# Patient Record
Sex: FEMALE | Race: BLACK OR AFRICAN AMERICAN | NOT HISPANIC OR LATINO | Employment: FULL TIME | URBAN - METROPOLITAN AREA
[De-identification: names, ages, dates, MRNs, and addresses within clinical notes are randomized per-mention and may not be internally consistent; named-entity substitution may affect disease eponyms.]

---

## 2017-05-22 ENCOUNTER — HOSPITAL ENCOUNTER (EMERGENCY)
Facility: HOSPITAL | Age: 62
Discharge: HOME | End: 2017-05-22
Attending: EMERGENCY MEDICINE | Admitting: INTERNAL MEDICINE
Payer: COMMERCIAL

## 2017-05-22 VITALS
WEIGHT: 205 LBS | TEMPERATURE: 96.8 F | HEART RATE: 61 BPM | DIASTOLIC BLOOD PRESSURE: 64 MMHG | RESPIRATION RATE: 18 BRPM | SYSTOLIC BLOOD PRESSURE: 123 MMHG | OXYGEN SATURATION: 100 % | HEIGHT: 69 IN | BODY MASS INDEX: 30.36 KG/M2

## 2017-05-22 DIAGNOSIS — T18.128A FOOD IMPACTION OF ESOPHAGUS, INITIAL ENCOUNTER: ICD-10-CM

## 2017-05-22 DIAGNOSIS — T18.128A FOOD IMPACTION OF ESOPHAGUS: Primary | ICD-10-CM

## 2017-05-22 PROCEDURE — 99284 EMERGENCY DEPT VISIT MOD MDM: CPT

## 2017-05-22 PROCEDURE — 88305 TISSUE EXAM BY PATHOLOGIST: CPT | Performed by: INTERNAL MEDICINE

## 2017-05-22 PROCEDURE — 88312 SPECIAL STAINS GROUP 1: CPT | Performed by: INTERNAL MEDICINE

## 2017-05-22 PROCEDURE — 96374 THER/PROPH/DIAG INJ IV PUSH: CPT

## 2017-05-22 PROCEDURE — 96375 TX/PRO/DX INJ NEW DRUG ADDON: CPT

## 2017-05-22 RX ORDER — MIDAZOLAM HYDROCHLORIDE 1 MG/ML
INJECTION INTRAMUSCULAR; INTRAVENOUS
Status: DISCONTINUED
Start: 2017-05-22 | End: 2017-05-22 | Stop reason: HOSPADM

## 2017-05-22 RX ORDER — FENTANYL CITRATE 50 UG/ML
50 INJECTION, SOLUTION INTRAMUSCULAR; INTRAVENOUS ONCE
Status: COMPLETED | OUTPATIENT
Start: 2017-05-22 | End: 2017-05-22

## 2017-05-22 RX ORDER — FENTANYL CITRATE 50 UG/ML
INJECTION, SOLUTION INTRAMUSCULAR; INTRAVENOUS
Status: DISCONTINUED
Start: 2017-05-22 | End: 2017-05-22 | Stop reason: HOSPADM

## 2017-05-22 RX ORDER — MIDAZOLAM HYDROCHLORIDE 1 MG/ML
2 INJECTION INTRAMUSCULAR; INTRAVENOUS ONCE
Status: COMPLETED | OUTPATIENT
Start: 2017-05-22 | End: 2017-05-22

## 2017-05-22 RX ORDER — PROPOFOL 10 MG/ML
INJECTION, EMULSION INTRAVENOUS
Status: COMPLETED
Start: 2017-05-22 | End: 2017-05-22

## 2017-05-22 RX ORDER — PROPOFOL 10 MG/ML
120 INJECTION, EMULSION INTRAVENOUS ONCE
Status: COMPLETED | OUTPATIENT
Start: 2017-05-22 | End: 2017-05-22

## 2017-05-22 RX ORDER — MIDAZOLAM HYDROCHLORIDE 1 MG/ML
4 INJECTION INTRAMUSCULAR; INTRAVENOUS ONCE
Status: DISCONTINUED | OUTPATIENT
Start: 2017-05-22 | End: 2017-05-22

## 2017-05-22 RX ORDER — FENTANYL CITRATE 50 UG/ML
100 INJECTION, SOLUTION INTRAMUSCULAR; INTRAVENOUS ONCE
Status: DISCONTINUED | OUTPATIENT
Start: 2017-05-22 | End: 2017-05-22

## 2017-05-22 RX ADMIN — PROPOFOL 120 MG: 10 INJECTION, EMULSION INTRAVENOUS at 19:20

## 2017-05-22 RX ADMIN — GLUCAGON HYDROCHLORIDE 1 MG: 1 INJECTION, POWDER, FOR SOLUTION INTRAMUSCULAR; INTRAVENOUS; SUBCUTANEOUS at 17:11

## 2017-05-22 RX ADMIN — GLUCAGON HYDROCHLORIDE 1 MG: KIT at 19:30

## 2017-05-22 RX ADMIN — FENTANYL CITRATE 50 MCG: 50 INJECTION INTRAMUSCULAR; INTRAVENOUS at 19:18

## 2017-05-22 RX ADMIN — FENTANYL CITRATE 50 MCG: 50 INJECTION INTRAMUSCULAR; INTRAVENOUS at 19:15

## 2017-05-22 RX ADMIN — MIDAZOLAM 2 MG: 1 INJECTION INTRAMUSCULAR; INTRAVENOUS at 19:15

## 2017-05-22 RX ADMIN — MIDAZOLAM 2 MG: 1 INJECTION INTRAMUSCULAR; INTRAVENOUS at 19:18

## 2017-12-19 ENCOUNTER — ALLSCRIPTS OFFICE VISIT (OUTPATIENT)
Dept: OTHER | Facility: OTHER | Age: 62
End: 2017-12-19

## 2017-12-19 ENCOUNTER — TRANSCRIBE ORDERS (OUTPATIENT)
Dept: ADMINISTRATIVE | Facility: HOSPITAL | Age: 62
End: 2017-12-19

## 2017-12-19 DIAGNOSIS — Z13.6 ENCOUNTER FOR SCREENING FOR CARDIOVASCULAR DISORDERS: ICD-10-CM

## 2017-12-19 DIAGNOSIS — R06.00 DYSPNEA: ICD-10-CM

## 2017-12-19 DIAGNOSIS — R06.00 DYSPNEA, UNSPECIFIED TYPE: Primary | ICD-10-CM

## 2017-12-19 DIAGNOSIS — R94.31 ABNORMAL ELECTROCARDIOGRAM: ICD-10-CM

## 2017-12-20 NOTE — PROGRESS NOTES
Assessment  1  Palpitations (785 1) (R00 2)   2  Dyspnea (786 09) (R06 00)   3  History of syncope (V15 89) (Z87 898)   4  Screening for cardiovascular condition (V81 2) (Z13 6)    Plan   · * NM MYOCARDIAL PERFUSION SPECT (STRESS AND/OR REST); Status:NeedInformation - Financial Authorization; Requested for:[id number];    Perform:[facility]; Due:61Sui2017; Ordered; For:Abnormal EKG, Screening for cardiovascular condition; Ordered By:Luzma Bolton;   · ECHO COMPLETE WITH CONTRAST IF INDICATED; Status:Need Information - FinancialAuthorization; Requested for:[id number];    Perform:[facility]; Due:49Dio9549; Ordered; For:Dyspnea; Ordered By:Luzma Bolton;   · HOLTER MONITOR - 24 HOUR; Status:Hold For - Scheduling; Requestedfor:[id number];    Perform:[facility]; Due:38Cnc8263; Ordered; For:Dyspnea; Ordered By:Luzma Bolton;   · Follow-up visit in 3 weeks Evaluation and Treatment  Follow-up  Status: Hold For -Scheduling  Requested for: 71FAM4659   Ordered; For: Dyspnea; Ordered By: Sridhar Rendon Performed:  Due: 78APS7781   · A diet that is low in fat, cholesterol, and sodium is considered a cardiac diet  ;Status:Complete;   Done: 42NVQ8482 01:52PM   Ordered; For:Dyspnea; Ordered By:Luzma Bolton;   · EKG/ECG- POC; Status:Complete;   Done: 48NCB3585   Perform: In Office; Due:98Dzb9226; Last Updated By:Tomi Nicole; 12/19/2017 1:28:36 PM;Ordered; For:Palpitations; Ordered By:Luzma Bolton; Discussion/Summary    Palpitations & dyspnea: unclear etiology, with h/o syncope a year ago  Will evaluate for heart related abnormalities with a stress test to rule out ischemia - with baseline ST changes on ECG, nuclear imaging would add to sensitivity of the test, echo to rule out structural heart disease (especially with murmur on exam), and a Holter to rule out arrhythmias  The patient was counseled regarding diagnostic results,-- instructions for management,-- risk factor reductions,-- impressions  Chief Complaint  patient at the office for initial consults, patient staying sometimes feel shortness of breath, her hands feels numbness at morning time  History of Present Illness  Cardiology (Follow-Up): The patient states she has been generally Patient is here evaluation of irregularity in her heart beating  Also has been feeling more short of breath recently  She was in the ER last December with an episode of syncope  At that time, she was noted to have decreased exercise capacity  Since then she has been having fluttering in her chest with some left arm numbness/spasms  She also has some shortness of breath usually with exertion  She has noted some increased fatigue with exercise  since the last visit  Symptoms: denies chest pain at rest,-- denies exertional chest pain,-- new onset of dyspnea,-- denies fatigue,-- new onset of exercise intolerance,-- new onset of palpitations,-- denies edema,-- denies orthopnea,-- denies claudication,-- denies dizziness-- and-- denies orthostatic dizziness  Associated symptoms: no syncope,-- no PND,-- no tendency for easy bleeding,-- no tendency for easy bruising,-- no TIA symptoms-- and-- no recent weight gain  Disease Monitoring:  Medications: The patient is not currently on any medications  Review of Systems     Cardiac: palpitations present , but-- no chest pain,-- no rhythm problems,-- no fainting/blackouts,-- no heart murmur present-- and-- no signs of swelling  Skin: No complaints of nonhealing sores or skin rash  Genitourinary: post menopausal, but-- no recurrent urinary tract infections,-- no frequent urination at night,-- no difficult urination,-- no blood in urine,-- no kidney stones,-- no loss of bladder control,-- no kidney problems,-- no birth control or hormone replacement therapy-- and-- not pregnant  Psychological: No complaints of feeling depressed, anxiety, panic attacks, or difficulty concentrating    General: lack of energy/fatigue, but-- no trouble sleeping,-- no appetite changes,-- no changes in weight,-- no fever,-- no night sweats-- and-- no frequent infections  Respiratory: shortness of breath, but-- no cough/sputum,-- no wheezing,-- no phlegm-- and-- no hemoptysis  HEENT: No complaints of serious problems, hearing problems, nose problems, throat problems, or snoring  Gastrointestinal: No complaints of liver problems, nausea, vomiting, heartburn, constipation, bloody stools, diarrhea, problems swallowing, adbominal pain, or rectal bleeding  Hematologic: No complaints of bleeding disorders, anemia, blood clots, or excessive brusing  Neurological: No complaints of numbness, tingling, dizziness, weakness, seizures, headaches, syncope or fainting, AM fatigue, daytime sleepiness, no witnessed apnea episodes  Musculoskeletal: No complaints of arthritis, back pain, or painfull swelling  ROS reviewed  Active Problems  1  Dyspnea (786 09) (R06 00)   2  Palpitations (785 1) (R00 2)    Past Medical History   · History of anemia (V12 3) (Z86 2)   · History of syncope (V15 89) (F43 584)   · History of thyroid disorder (V12 29) (Z86 39)    The active problems and past medical history were reviewed and updated today  Surgical History   · History of Foot Surgery    The surgical history was reviewed and updated today  Family History  Mother    · No pertinent family history    The family history was reviewed and updated today  Social History   · No alcohol use   · No illicit drug use   · Social alcohol use (Z78 9)  The social history was reviewed and updated today  Current Meds   1  Levothyroxine Sodium 125 MCG CAPS; take 1 capsule daily; Therapy: (Recorded:81Anm3016) to Recorded    The medication list was reviewed and updated today  Allergies  1   No Known Drug Allergies    Vitals  Signs   Heart Rate: 61  Systolic: 745  Diastolic: 79  Height: 5 ft 9 in  Weight: 215 lb   BMI Calculated: 31 75  BSA Calculated: 2 13    Physical Exam   Constitutional - General appearance: No acute distress, well appearing and well nourished  Eyes - Conjunctiva and Sclera examination: Conjunctiva pink, sclera anicteric  Neck - Normal, no JVD   Pulmonary - Respiratory effort: No signs of respiratory distress  -- Auscultation of lungs: Clear to auscultation  Cardiovascular - Auscultation of heart: Abnormal   The heart rate was normal  The rhythm was regular  Heart sounds: normal S1,-- normal S2,-- no S3-- and-- no S4  A grade 2 systolic murmur was heard at the RUSB  -- Pedal pulses: Normal, 2+ bilaterally  -- Examination of extremities for edema and/or varicosities: Normal    Abdomen - Soft  Musculoskeletal - Gait and station: Normal gait  Skin - Skin: Normal without rashes  Skin is warm and well perfused  Neurologic - Speech normal  No focal deficits  Psychiatric - Orientation to person, place, and time: Normal       Results/Data   Rhythm and rate:  normal sinus rhythm  P-waves:   the P wave is normal -- ME interval is normal   Axis: the QRS axis is normal   QRS: the QRS is normal  T waves:   there are nonspecific ST-T wave changes  Comparison to prior ECGs: from Dec 2016-- no interval change        Signatures   Electronically signed by : PARDEEP Rodriguez ; Dec 19 2017  2:03PM EST                       (Author)

## 2018-01-11 ENCOUNTER — HOSPITAL ENCOUNTER (OUTPATIENT)
Dept: NON INVASIVE DIAGNOSTICS | Facility: CLINIC | Age: 63
Discharge: HOME | End: 2018-01-11
Payer: COMMERCIAL

## 2018-01-11 ENCOUNTER — GENERIC CONVERSION - ENCOUNTER (OUTPATIENT)
Dept: CARDIOLOGY CLINIC | Facility: CLINIC | Age: 63
End: 2018-01-11

## 2018-01-11 ENCOUNTER — GENERIC CONVERSION - ENCOUNTER (OUTPATIENT)
Dept: OTHER | Facility: OTHER | Age: 63
End: 2018-01-11

## 2018-01-11 DIAGNOSIS — R06.00 DYSPNEA: ICD-10-CM

## 2018-01-11 DIAGNOSIS — R06.00 DYSPNEA, UNSPECIFIED TYPE: ICD-10-CM

## 2018-01-11 PROCEDURE — 78452 HT MUSCLE IMAGE SPECT MULT: CPT

## 2018-01-11 PROCEDURE — 93017 CV STRESS TEST TRACING ONLY: CPT

## 2018-01-11 PROCEDURE — 93306 TTE W/DOPPLER COMPLETE: CPT

## 2018-01-11 PROCEDURE — 93225 XTRNL ECG REC<48 HRS REC: CPT

## 2018-01-11 PROCEDURE — A9502 TC99M TETROFOSMIN: HCPCS

## 2018-01-11 PROCEDURE — 93226 XTRNL ECG REC<48 HR SCAN A/R: CPT

## 2018-01-12 LAB
ARRHY DURING EX: NORMAL
CHEST PAIN STATEMENT: NORMAL
MAX DIASTOLIC BP: 90 MMHG
MAX HEART RATE: 144 BPM
MAX PREDICTED HEART RATE: 160 BPM
MAX. SYSTOLIC BP: 186 MMHG
PROTOCOL NAME: NORMAL
REASON FOR TERMINATION: NORMAL
TARGET HR FORMULA: NORMAL
TEST INDICATION: NORMAL
TIME IN EXERCISE PHASE: NORMAL

## 2018-01-22 VITALS
DIASTOLIC BLOOD PRESSURE: 79 MMHG | WEIGHT: 215 LBS | HEART RATE: 61 BPM | HEIGHT: 69 IN | BODY MASS INDEX: 31.84 KG/M2 | SYSTOLIC BLOOD PRESSURE: 104 MMHG

## 2018-01-23 NOTE — CONSULTS
I had the pleasure of evaluating your patient, Belkis Malone  My full evaluation follows:      Chief Complaint  patient at the office for initial consults, patient staying sometimes feel shortness of breath, her hands feels numbness at morning time  History of Present Illness  Cardiology (Follow-Up): The patient states she has been generally Patient is here evaluation of irregularity in her heart beating  Also has been feeling more short of breath recently  She was in the ER last December with an episode of syncope  At that time, she was noted to have decreased exercise capacity  Since then she has been having fluttering in her chest with some left arm numbness/spasms  She also has some shortness of breath usually with exertion  She has noted some increased fatigue with exercise  since the last visit  Symptoms: denies chest pain at rest, denies exertional chest pain, new onset of dyspnea, denies fatigue, new onset of exercise intolerance, new onset of palpitations, denies edema, denies orthopnea, denies claudication, denies dizziness and denies orthostatic dizziness  Associated symptoms: no syncope, no PND, no tendency for easy bleeding, no tendency for easy bruising, no TIA symptoms and no recent weight gain  Disease Monitoring:   Medications: The patient is not currently on any medications  Review of Systems      Cardiac: palpitations present , but no chest pain, no rhythm problems, no fainting/blackouts, no heart murmur present and no signs of swelling  Skin: No complaints of nonhealing sores or skin rash  Genitourinary: post menopausal, but no recurrent urinary tract infections, no frequent urination at night, no difficult urination, no blood in urine, no kidney stones, no loss of bladder control, no kidney problems, no birth control or hormone replacement therapy and not pregnant   Psychological: No complaints of feeling depressed, anxiety, panic attacks, or difficulty concentrating  General: lack of energy/fatigue, but no trouble sleeping, no appetite changes, no changes in weight, no fever, no night sweats and no frequent infections  Respiratory: shortness of breath, but no cough/sputum, no wheezing, no phlegm and no hemoptysis  HEENT: No complaints of serious problems, hearing problems, nose problems, throat problems, or snoring  Gastrointestinal: No complaints of liver problems, nausea, vomiting, heartburn, constipation, bloody stools, diarrhea, problems swallowing, adbominal pain, or rectal bleeding  Hematologic: No complaints of bleeding disorders, anemia, blood clots, or excessive brusing  Neurological: No complaints of numbness, tingling, dizziness, weakness, seizures, headaches, syncope or fainting, AM fatigue, daytime sleepiness, no witnessed apnea episodes  Musculoskeletal: No complaints of arthritis, back pain, or painfull swelling  ROS reviewed  Active Problems    1  Dyspnea (786 09) (R06 00)   2  Palpitations (785 1) (R00 2)    Past Medical History    · History of anemia (V12 3) (Z86 2)   · History of syncope (V15 89) (M71 416)   · History of thyroid disorder (V12 29) (Z86 39)    The active problems and past medical history were reviewed and updated today  Surgical History    · History of Foot Surgery    The surgical history was reviewed and updated today  Family History    · No pertinent family history    The family history was reviewed and updated today  Social History    · No alcohol use   · No illicit drug use   · Social alcohol use (Z78 9)  The social history was reviewed and updated today  Current Meds   1  Levothyroxine Sodium 125 MCG CAPS; take 1 capsule daily; Therapy: (Recorded:29Vcp8037) to Recorded    The medication list was reviewed and updated today  Allergies    1   No Known Drug Allergies    Vitals  Signs    Heart Rate: 61  Systolic: 360  Diastolic: 79  Height: 5 ft 9 in  Weight: 215 lb   BMI Calculated: 31 75  BSA Calculated: 2 13    Physical Exam    Constitutional - General appearance: No acute distress, well appearing and well nourished  Eyes - Conjunctiva and Sclera examination: Conjunctiva pink, sclera anicteric  Neck - Normal, no JVD   Pulmonary - Respiratory effort: No signs of respiratory distress  Auscultation of lungs: Clear to auscultation  Cardiovascular - Auscultation of heart: Abnormal   The heart rate was normal  The rhythm was regular  Heart sounds: normal S1, normal S2, no S3 and no S4  A grade 2 systolic murmur was heard at the RUSB  Pedal pulses: Normal, 2+ bilaterally  Examination of extremities for edema and/or varicosities: Normal     Abdomen - Soft  Musculoskeletal - Gait and station: Normal gait  Skin - Skin: Normal without rashes  Skin is warm and well perfused  Neurologic - Speech normal  No focal deficits  Psychiatric - Orientation to person, place, and time: Normal       Results/Data    Rhythm and rate:  normal sinus rhythm  P-waves:   the P wave is normal  NV interval is normal    Axis: the QRS axis is normal    QRS: the QRS is normal   T waves:   there are nonspecific ST-T wave changes  Comparison to prior ECGs: from Dec 2016 no interval change  Assessment    1  Palpitations (785 1) (R00 2)   2  Dyspnea (786 09) (R06 00)   3  History of syncope (V15 89) (Z87 898)   4  Screening for cardiovascular condition (V81 2) (Z13 6)    Plan  Abnormal EKG, Screening for cardiovascular condition    · * NM MYOCARDIAL PERFUSION SPECT (STRESS AND/OR REST); Status:Need  Information - Financial Authorization; Requested for:[id number];    Perform:[facility]; Due:54Byt3783; Ordered; For:Abnormal EKG, Screening for cardiovascular condition; Ordered By:Luzma Bolton;  Dyspnea    · ECHO COMPLETE WITH CONTRAST IF INDICATED; Status:Need Information - Financial  Authorization; Requested for:[id number];    Perform:[facility]; Due:85Ozf4117; Ordered;   For:Dyspnea; Ordered By:Luzma Bolton;   · HOLTER MONITOR - 24 HOUR; Status:Hold For - Scheduling; Requested  for:56Kln8646;    [facility]; Due:90Esl4131; Ordered; For:Dyspnea; Ordered By:Luzma Bolton;   · Follow-up visit in 3 weeks Evaluation and Treatment  Follow-up  Status: Hold For -  Scheduling  Requested for: 33WZO0820   Ordered; For: Dyspnea; Ordered By: Zoe Almonte Performed:  Due: 78YHB7389   · A diet that is low in fat, cholesterol, and sodium is considered a cardiac diet ;  Status:Complete;   Done: 74LYZ0201 01:52PM   Ordered; For:Dyspnea; Ordered By:Luzma Bolton;  Palpitations    · EKG/ECG- POC; Status:Complete;   Done: 99PMN1913   Perform: In Office; Due:11Ivt3311; Last Updated By:Sandy Nicole; 12/19/2017 1:28:36 PM;Ordered; For:Palpitations; Ordered By:Luzma Bolton; Discussion/Summary    Palpitations & dyspnea: unclear etiology, with h/o syncope a year ago  Will evaluate for heart related abnormalities with a stress test to rule out ischemia - with baseline ST changes on ECG, nuclear imaging would add to sensitivity of the test, echo to rule out structural heart disease (especially with murmur on exam), and a Holter to rule out arrhythmias  The patient was counseled regarding diagnostic results, instructions for management, risk factor reductions, impressions  Thank you very much for allowing me to participate in the care of this patient  If you have any questions, please do not hesitate to contact me        Signatures   Electronically signed by : PARDEEP Luo ; Dec 19 2017  2:03PM EST                       (Author)

## 2018-01-26 ENCOUNTER — OFFICE VISIT (OUTPATIENT)
Dept: CARDIOLOGY CLINIC | Facility: CLINIC | Age: 63
End: 2018-01-26
Payer: COMMERCIAL

## 2018-01-26 VITALS — DIASTOLIC BLOOD PRESSURE: 70 MMHG | SYSTOLIC BLOOD PRESSURE: 100 MMHG | HEART RATE: 60 BPM

## 2018-01-26 DIAGNOSIS — R94.31 ABNORMAL EKG: Primary | ICD-10-CM

## 2018-01-26 DIAGNOSIS — R06.00 DYSPNEA ON EXERTION: ICD-10-CM

## 2018-01-26 DIAGNOSIS — R00.2 PALPITATIONS: ICD-10-CM

## 2018-01-26 PROCEDURE — 99214 OFFICE O/P EST MOD 30 MIN: CPT | Performed by: INTERNAL MEDICINE

## 2018-01-26 NOTE — PATIENT INSTRUCTIONS
Heart Palpitations   AMBULATORY CARE:   Heart palpitations  are feelings that your heart races, jumps, throbs, or flutters  You may feel extra beats, no beats for a short time, or skipped beats  You may have these feelings in your chest, throat, or neck  They may happen when you are sitting, standing, or lying  Heart palpitations may be frightening, but are usually not caused by a serious problem  Call 911 or have someone else call for any of the following:   · You have any of the following signs of a heart attack:      ¨ Squeezing, pressure, or pain in your chest that lasts longer than 5 minutes or returns    ¨ Discomfort or pain in your back, neck, jaw, stomach, or arm     ¨ Trouble breathing    ¨ Nausea or vomiting    ¨ Lightheadedness or a sudden cold sweat, especially with chest pain or trouble breathing    · You have any of the following signs of a stroke:      ¨ Numbness or drooping on one side of your face     ¨ Weakness in an arm or leg    ¨ Confusion or difficulty speaking    ¨ Dizziness, a severe headache, or vision loss    · You faint or lose consciousness  Seek care immediately if:   · Your palpitations happen more often or last longer than usual      · You have palpitations and shortness of breath, nausea, sweating, or dizziness  Contact your healthcare provider if:   · You have questions or concerns about your condition or care  Follow up with your healthcare provider as directed: You may need to follow up with a cardiologist  Marcey Canavan may need tests to check for heart problems that cause palpitations  Write down your questions so you remember to ask them during your visits  Treatment for heart palpitations  is usually not needed  Your healthcare provider may stop or change your medicines if they are causing your palpitations  Conditions that cause palpitations, such as an abnormal heartbeat, will be treated     Keep a record:  Write down when your palpitations start and stop, what you were doing when they started, and your symptoms  Keep track of what you ate or drank within a few hours of your palpitations  Include anything that seemed to help your symptoms, such as lying down or holding your breath  This record will help you and your healthcare provider learn what triggers your palpitations  Bring this record with you to your follow up visits  Help prevent heart palpitations:   · Manage stress and anxiety  Find ways to relax such as listening to music, meditating, or doing yoga  Exercise can also help decrease stress and anxiety  Talk to someone you trust about your stress or anxiety  You can also talk to a therapist      · Get plenty of sleep every night  Ask your healthcare provider how much sleep you need each night  · Do not drink caffeine or alcohol  Caffeine and alcohol can make your palpitations worse  Caffeine is found in soda, coffee, tea, chocolate, and drinks that increase your energy  · Do not smoke  Nicotine and other chemicals in cigarettes and cigars may damage your heart and blood vessels  Ask your healthcare provider for information if you currently smoke and need help to quit  E-cigarettes or smokeless tobacco still contain nicotine  Talk to your healthcare provider before you use these products  · Do not use illegal drugs  Talk to your healthcare provider if you use illegal drugs and want help to quit  © 2017 [facility] Information is for End User's use only and may not be sold, redistributed or otherwise used for commercial purposes  All illustrations and images included in CareNotes® are the copyrighted property of [employer]  or Avni Edgar  The above information is an  only  It is not intended as medical advice for individual conditions or treatments  Talk to your doctor, nurse or pharmacist before following any medical regimen to see if it is safe and effective for you

## 2018-01-26 NOTE — PROGRESS NOTES
Cardiology Follow Up    Keisha Pappas  1955  [id number]  HEART & VASCULAR [facility]  [address]    1  Abnormal EKG     2  Dyspnea on exertion     3  Palpitations         Interval History: Feels about the same as last visit with intermittent palpitations and dyspnea on exertion  No other new symptoms  Patient Active Problem List   Diagnosis    Abnormal EKG    Dyspnea    Acquired hypothyroidism    Palpitations     Past Medical History:   Diagnosis Date    Anemia     Disease of thyroid gland      Social History     Social History    Marital status:      Spouse name: N/A    Number of children: N/A    Years of education: N/A     Occupational History    Not on file  Social History Main Topics    Smoking status: Never Smoker    Smokeless tobacco: Never Used    Alcohol use No    Drug use: No    Sexual activity: Not on file     Other Topics Concern    Not on file     Social History Narrative    No narrative on file      History reviewed  No pertinent family history  Past Surgical History:   Procedure Laterality Date    ESOPHAGOGASTRODUODENOSCOPY N/A 5/22/2017    Procedure: ESOPHAGOGASTRODUODENOSCOPY (EGD); Surgeon: Randall Donohue MD;  Location: [facility]; Service:        Current Outpatient Prescriptions:     LEVOTHYROXINE SODIUM PO, Take 112 mg by mouth daily  , Disp: , Rfl:     FERROUS SULFATE PO, Take by mouth, Disp: , Rfl:   No Known Allergies    Labs:  Hospital Outpatient Visit on 01/11/2018   Component Date Value    Protocol Name 01/11/2018 NEHAL     Time In Exercise Phase 01/11/2018 00:09:00     MAX   SYSTOLIC BP 25/66/2581 500     Max Diastolic Bp 30/73/7257 90     Max Heart Rate 01/11/2018 144     Max Predicted Heart Rate 01/11/2018 160     Reason for Termination 01/11/2018 Fatigue     Test Indication 01/11/2018 Dyspnea     Target Hr Formular 01/11/2018 (220 - Age)*100%     Arrhy During Ex 2018 ventricular premature beats     Chest Pain Statement 2018 none      No results found for: CHOL, TRIG, HDL, LDLDIRECT  Imaging: Nm Procedure (historical)    Result Date: 2018  Narrative: Jaqui [address]  [address] [facility], [address] [place] [address]  [phone]  Rest/Stress Gated SPECT Myocardial Perfusion Imaging After Exercise  Patient: Cyril Croft  MR number: [id number]  Account number: [de-[email or web address]  : 1955  Age: 63 years  Gender: Female  Status: Outpatient  Location: [address] [place] [address] [facility] [place]  Height: 69 in  Weight: 215 lb  BP: 122/ 80 mmHg  Allergies: NO KNOWN ALLERGIES  Diagnosis: R94 31 - Abnormal electrocardiogram [ECG] [EKG]  Interpreting Physician:  Gayathri Bro DO  Referring Physician:  Cari Lesches, MD  Technician:  TYLER Bellamy  RN:  Andi Soto RN  Group:  [facility] [place] [id number] [facility]  Cardiology Fellow:  Pablo Roman MD  Report Prepared by[de-identified]  Andi Soto RN  INDICATIONS: Coronary artery disease  HISTORY: The patient is a 63year old  female  Chest pain status: recent onset chest pain  Other symptoms: dyspnea and palpitations, syncope Coronary artery disease risk factors: post-menopausal state  Cardiovascular  history: none significant  Medications: no cardiac drugs  PHYSICAL EXAM: Baseline physical exam screening: normal   REST ECG: Sinus bradycardia  Nonspecific ST and T wave abnormalities were present  PROCEDURE: The study was performed at the [address]  Treadmill exercise testing was performed, using the Rusty protocol  Gated SPECT myocardial perfusion imaging was performed after stress  Systolic blood pressure  was 122 mmHg, at the start of the study  Diastolic blood pressure was 80 mmHg, at the start of the study  The heart rate was 47 bpm, at the start of the study  IV double checked    RUSTY PROTOCOL:  HR bpm SBP mmHg DBP mmHg Symptoms Baseline 47 122 80 none  Stage 1 107 138 80 none  Stage 2 125 154 80 none  Stage 3 144 170 84 moderate fatigue  Recovery 1 97 186 90 none  Recovery 2 81 164 90 none  No medications or fluids given  STRESS SUMMARY: Duration of exercise was 9 min  The patient exercised to protocol stage 3  Maximal work rate was 10 1 METs  Functional capacity was normal  Maximal heart rate during stress was 144 bpm ( 90 % of maximal predicted heart  rate)  The heart rate response to stress was normal  There was normal resting blood pressure with an appropriate response to stress  The rate-pressure product for the peak heart rate and blood pressure was 80948  There was no chest pain  during stress  The stress test was terminated due to achievement of maximal (symptom limited) exercise  Pre oxygen saturation: 99 %  Peak oxygen saturation: 99 %  The stress ECG was negative for ischemia and normal  Arrhythmia during  stress: isolated premature ventricular beats and pairs of premature ventricular beats  ISOTOPE ADMINISTRATION:  Resting isotope administration Stress isotope administration  Agent Tetrofosmin Tetrofosmin  Dose 10 11 mCi 31 1 mCi  Date 01/11/2018 01/11/2018  Injection time 11:45 14:15  Imaging time 13:31 14:53  Injection-image interval 106 min 38 min  The radiopharmaceutical was injected one minute before the end of exercise  IMAGE PROPERTIES:  Chest: circumference 38 in, D cup  MYOCARDIAL PERFUSION IMAGING:  The image quality was good  Left ventricular size was normal  The TID ratio was 0 8  PERFUSION DEFECTS:  -  There were no perfusion defects  GATED SPECT:  The calculated left ventricular ejection fraction was 70 %  Left ventricular ejection fraction was within normal limits by visual estimate  There was no left ventricular regional abnormality  SUMMARY:  -  Stress results: Duration of exercise was 9 min  Target heart rate was achieved  There was no chest pain during stress  -  ECG conclusions:  The stress ECG was negative for ischemia and normal   -  Perfusion imaging: There were no perfusion defects   -  Gated SPECT: The calculated left ventricular ejection fraction was 70 %  Left ventricular ejection fraction was within normal limits by visual estimate  There was no left ventricular regional abnormality  IMPRESSIONS: Normal study after maximal exercise without reproduction of symptoms  Myocardial perfusion imaging was normal at rest and with stress  Left ventricular systolic function was normal   Prepared and signed by  Emma Winter DO  Signed 01/11/2018 16:03:20      Review of Systems:  Review of Systems   Constitutional: Positive for fatigue  Negative for activity change, appetite change, chills, diaphoresis and unexpected weight change  HENT: Negative for hearing loss, nosebleeds and sore throat  Eyes: Negative for photophobia and visual disturbance  Respiratory: Positive for shortness of breath  Negative for cough, chest tightness and wheezing  Cardiovascular: Positive for palpitations  Negative for chest pain and leg swelling  Gastrointestinal: Negative for abdominal pain, diarrhea, nausea and vomiting  Endocrine: Negative for polyuria  Genitourinary: Negative for dysuria, frequency and hematuria  Musculoskeletal: Negative for arthralgias, back pain, gait problem and neck pain  Skin: Negative for pallor and rash  Neurological: Negative for dizziness, syncope and headaches  Hematological: Does not bruise/bleed easily  Psychiatric/Behavioral: Negative for behavioral problems and confusion  Physical Exam:  Physical Exam   Constitutional: She is oriented to person, place, and time  She appears well-developed and well-nourished  HENT:   Head: Normocephalic and atraumatic  Nose: Nose normal    Eyes: EOM are normal  Pupils are equal, round, and reactive to light  No scleral icterus  Neck: Normal range of motion  Neck supple  No JVD present     Cardiovascular: Normal rate and regular rhythm  Exam reveals no gallop and no friction rub  Murmur heard  Systolic murmur is present with a grade of 2/6   Pulmonary/Chest: Effort normal and breath sounds normal  No respiratory distress  She has no wheezes  She has no rales  Abdominal: Soft  Bowel sounds are normal  She exhibits no distension  There is no tenderness  Musculoskeletal: Normal range of motion  She exhibits no edema or deformity  Neurological: She is alert and oriented to person, place, and time  No cranial nerve deficit  Skin: Skin is warm and dry  No rash noted  She is not diaphoretic  Psychiatric: She has a normal mood and affect  Her behavior is normal    Vitals reviewed  Holter monitor:  IMPRESSION:  1  The patient was in Sinus rhythm throughout the duration of the study  2  The average heart rate was 56 bpm    3  Rare ectopy without any arrhythmias      Nuclear stress test:  IMPRESSIONS: Normal study after maximal exercise without reproduction of symptoms  Myocardial perfusion imaging was normal at rest and with stress  Left ventricular systolic function was normal     Echo:  LEFT VENTRICLE:  Size was normal   Systolic function was normal  Ejection fraction was estimated to be 65 %  Left ventricular diastolic function parameters were normal      RIGHT VENTRICLE:  The size was normal   Systolic function was normal      TRICUSPID VALVE:  There was mild regurgitation  Discussion/Summary:  Palpitations & dyspnea: unclear etiology, with h/o syncope a year ago  We evaluated for heart related abnormalities with a stress test that ruled out ischemia, echo was done that ruled out structural heart disease (especially with murmur on exam), and a Holter that ruled out arrhythmias  No further cardiovascular studies required at this time

## 2018-12-11 ENCOUNTER — OFFICE VISIT (OUTPATIENT)
Dept: PHYSICAL THERAPY | Facility: REHABILITATION | Age: 63
End: 2018-12-11
Payer: COMMERCIAL

## 2018-12-11 DIAGNOSIS — M54.50 ACUTE RIGHT-SIDED LOW BACK PAIN WITHOUT SCIATICA: Primary | ICD-10-CM

## 2018-12-11 PROCEDURE — 97140 MANUAL THERAPY 1/> REGIONS: CPT | Performed by: PHYSICAL THERAPIST

## 2018-12-11 PROCEDURE — 97112 NEUROMUSCULAR REEDUCATION: CPT | Performed by: PHYSICAL THERAPIST

## 2018-12-11 PROCEDURE — G8991 OTHER PT/OT GOAL STATUS: HCPCS | Performed by: PHYSICAL THERAPIST

## 2018-12-11 PROCEDURE — G8990 OTHER PT/OT CURRENT STATUS: HCPCS | Performed by: PHYSICAL THERAPIST

## 2018-12-11 PROCEDURE — 97161 PT EVAL LOW COMPLEX 20 MIN: CPT | Performed by: PHYSICAL THERAPIST

## 2018-12-11 NOTE — PROGRESS NOTES
PT Evaluation     Today's date: 2018  Patient name: Malena Chang  : 1955  MRN: [id number]  Referring provider: Lakisha Presley PT  Dx:   Encounter Diagnosis     ICD-10-CM    1  Acute right-sided low back pain without sciatica M54 5        Start Time: 1400  Stop Time: 1450  Total time in clinic (min): 50 minutes    Assessment  Assessment details: 65 y/o female with c/o right-sided LBP  This most recent exacerbation occurred a few weeks ago after sitting in her car 12+ hours [place]  She c/o constant right localized L/S sx's (PSIS) which are worse with extension and right SB  She denies any B/B changes or other red flag sx's  Impairments: abnormal muscle tone, abnormal or restricted ROM, activity intolerance, lacks appropriate home exercise program and pain with function  Understanding of Dx/Px/POC: good   Prognosis: good    Goals  ST - I with HEP  2 - stand > 5 minutes to cook a meal with < 2/10 sx's  LT - FOTO > 74  2 - sit > 60 minutes while driving to/from work with < 2/10 LBP  3 - amb community distances without limitation  4 - lift 25# from floor to waist without pain      Plan  Patient would benefit from: skilled physical therapy  Planned therapy interventions: joint mobilization, manual therapy, neuromuscular re-education, patient education, postural training, home exercise program, therapeutic exercise and strengthening  Frequency: 2x week  Duration in visits: 7  Treatment plan discussed with: patient        Subjective Evaluation    Pain  Current pain ratin  At worst pain ratin  Quality: sharp and tight  Relieving factors: change in position  Aggravating factors: sitting and standing      Diagnostic Tests  No diagnostic tests performed        Objective     Static Posture     Comments  BP = 111/78    Pulse = 77 bpm        Tenderness     Right Hip   Tenderness in the PSIS       Neurological Testing     Sensation     Lumbar   Left Intact: pin prick    Right   Intact: pin prick    Active Range of Motion     Additional Active Range of Motion Details  Standing classical lx arom:    Flexion = wnl  extension = limited  Rotation:  Right = limited and painful, left = wfl  SB:  Right = "compression", left = "stretching"    Strength/Myotome Testing     Left Hip   Planes of Motion   Flexion: 5    Right Hip   Planes of Motion   Flexion: 5    General Comments     Lumbar Comments  Posture:  Anterior pelvic tilt      Flowsheet Rows      Most Recent Value   PT/OT G-Codes   Current Score  65   Projected Score  74   FOTO information reviewed  Yes   Assessment Type  Evaluation   G code set  Other PT/OT Primary   Other PT Primary Current Status ()  CK   Other PT Primary Goal Status ()  CJ          Precautions: anemia    Daily Treatment Diary     Manual  12/11            S/l gr 4 L5 opening LMR            Prone gr 1 & 2 rocking right PSIS LMR            S/l tissue deformation right sacral border LMR                                          Exercise Diary  12/11            Seated pelvic rocking 15            Seated PPT + TA 5"x5            HEP LMR                                                                                                                                                                                                                                             Modalities

## 2018-12-14 ENCOUNTER — OFFICE VISIT (OUTPATIENT)
Dept: PHYSICAL THERAPY | Facility: REHABILITATION | Age: 63
End: 2018-12-14
Payer: COMMERCIAL

## 2018-12-14 DIAGNOSIS — M54.50 ACUTE RIGHT-SIDED LOW BACK PAIN WITHOUT SCIATICA: Primary | ICD-10-CM

## 2018-12-14 PROCEDURE — 97110 THERAPEUTIC EXERCISES: CPT | Performed by: PHYSICAL THERAPIST

## 2018-12-14 PROCEDURE — 97530 THERAPEUTIC ACTIVITIES: CPT | Performed by: PHYSICAL THERAPIST

## 2018-12-14 PROCEDURE — 97140 MANUAL THERAPY 1/> REGIONS: CPT | Performed by: PHYSICAL THERAPIST

## 2018-12-14 NOTE — PROGRESS NOTES
Daily Note     Today's date: 2018  Patient name: Rogelio Marcum  : 1955  MRN: [id number]  Referring provider: Khushi Parker, PT  Dx:   Encounter Diagnosis     ICD-10-CM    1  Acute right-sided low back pain without sciatica M54 5        Start Time: 735  Stop Time: 0815  Total time in clinic (min): 40 minutes    Subjective: 1 - Pt denies any "compression" in the right lower lumbar region today, but states another part of her back is hurting  2 - She states her body felt more fluid during an exercise class on Wednesday  Objective: See treatment diary below  HEP updated with prone TA + LE ext and seated hip hinge with dowel  Lifting technique (floor to waist) = poor due to hip hinge at L3, knee hyperextension, and poor glute activation on the return to standing  Assessment: Tolerated treatment well  Patient would benefit from continued PT      Plan: Continue per plan of care           Precautions: anemia    Daily Treatment Diary     Manual             S/l gr 4 L5 opening LMR Gr 2 - LMR           Prone gr 1 & 2 rocking right PSIS LMR            S/l tissue deformation right sacral border LMR            Prone gr 2 P-A mid-thoracic mobs  LMR           Prone gr 2 sacral rocking  LMR                                                      Exercise Diary             Seated pelvic rocking 15 10           Seated PPT + TA 5"x5            HEP LMR LMR           Seated hip hinge with dowel  5'           Standing hip hinge with dowel  2x5           Lifting mechanics - floor to waist  10# - 5'           Prone TA + glute set + hip ext  15"x5                                                                                                                                                                                        Modalities

## 2018-12-21 ENCOUNTER — OFFICE VISIT (OUTPATIENT)
Dept: PHYSICAL THERAPY | Facility: REHABILITATION | Age: 63
End: 2018-12-21
Payer: COMMERCIAL

## 2018-12-21 DIAGNOSIS — M54.50 ACUTE RIGHT-SIDED LOW BACK PAIN WITHOUT SCIATICA: Primary | ICD-10-CM

## 2018-12-21 PROCEDURE — 97140 MANUAL THERAPY 1/> REGIONS: CPT | Performed by: PHYSICAL THERAPIST

## 2018-12-21 PROCEDURE — 97112 NEUROMUSCULAR REEDUCATION: CPT | Performed by: PHYSICAL THERAPIST

## 2018-12-21 PROCEDURE — 97110 THERAPEUTIC EXERCISES: CPT | Performed by: PHYSICAL THERAPIST

## 2018-12-21 NOTE — PROGRESS NOTES
Daily Note     Today's date: 2018  Patient name: Cayetano Gorman  : 1955  MRN: [id number]  Referring provider: Saad Garcia PT  Dx:   Encounter Diagnosis     ICD-10-CM    1  Acute right-sided low back pain without sciatica M54 5        Start Time: 0900  Stop Time: 0950  Total time in clinic (min): 50 minutes    Subjective:  Pt is trying to implement the core activation with lifting at the gym  Objective: See treatment diary below  Pt has difficulty activating her core without compensation (rib cage expansion or PPT)  Fair form with squatting  Good form with good mornings  Assessment: Tolerated treatment well  Patient demonstrated fatigue post treatment      Plan: Continue per plan of care           Precautions: anemia    Daily Treatment Diary     Manual            S/l gr 4 L5 opening LMR Gr 2 - LMR           Prone gr 1 & 2 rocking right PSIS LMR            S/l tissue deformation right sacral border LMR            Prone gr 2 P-A mid-thoracic mobs  LMR           Prone gr 2 sacral rocking  LMR LMR          Supine tissue deformation - right psoas   LMR                                        Exercise Diary            Seated pelvic rocking 15 10           Seated PPT + TA 5"x5            HEP LMR LMR LMR          Seated hip hinge with dowel  5'           Standing hip hinge with dowel  2x5           Lifting mechanics - floor to waist  10# - 5'           Prone TA + glute set + hip ext  15"x5           VG - b/l   L7 - 4'          VG with shoulder extension   L7 - 5' - orange          Bridges on p-ball   30"x4          Good morning   2x20          Squat mechanics with dowel   5'          OH anti-extension march   orange - 3'                                                                                                         Modalities

## 2018-12-28 ENCOUNTER — OFFICE VISIT (OUTPATIENT)
Dept: PHYSICAL THERAPY | Facility: REHABILITATION | Age: 63
End: 2018-12-28
Payer: COMMERCIAL

## 2018-12-28 DIAGNOSIS — M54.50 ACUTE RIGHT-SIDED LOW BACK PAIN WITHOUT SCIATICA: Primary | ICD-10-CM

## 2018-12-28 PROCEDURE — 97112 NEUROMUSCULAR REEDUCATION: CPT | Performed by: PHYSICAL THERAPIST

## 2018-12-28 PROCEDURE — 97110 THERAPEUTIC EXERCISES: CPT | Performed by: PHYSICAL THERAPIST

## 2018-12-28 NOTE — PROGRESS NOTES
Daily Note     Today's date: 2018  Patient name: Rogelio Marcum  : 1955  MRN: [id number]  Referring provider: Khushi Parker PT  Dx:   Encounter Diagnosis     ICD-10-CM    1  Acute right-sided low back pain without sciatica M54 5        Start Time: 0815  Stop Time: 0900  Total time in clinic (min): 45 minutes    Subjective: "My back is feeling better "      Objective: See treatment diary below  Lifting mechanics are improving  Assessment: Tolerated treatment well  Patient demonstrated fatigue post treatment      Plan: Continue per plan of care   Progress towards d/c        Precautions: anemia    Daily Treatment Diary     Manual           S/l gr 4 L5 opening LMR Gr 2 - LMR           Prone gr 1 & 2 rocking right PSIS LMR            S/l tissue deformation right sacral border LMR            Prone gr 2 P-A mid-thoracic mobs  LMR           Prone gr 2 sacral rocking  LMR LMR          Supine tissue deformation - right psoas   LMR                                        Exercise Diary           Seated pelvic rocking 15 10           Seated PPT + TA 5"x5            HEP LMR LMR LMR          Seated hip hinge with dowel  5'           Standing hip hinge with dowel  2x5           Lifting mechanics - floor to waist  10# - 5'           Prone TA + glute set + hip ext  15"x5           VG - b/l   L7 - 4'          VG with shoulder extension   L7 - 5' - orange L7 - orange - 10'         Bridges on p-ball   30"x4 1'x3         Good morning   2x20          Squat mechanics with dowel   5'          OH anti-extension march   orange - 3'          Lunges - front foot on bosu    30         Standing balance on bosu - black side up    1'x3         Mini squats on bosu - blue side up    2x20         Prone - banded hs curls    yellow - 3x15                                                    Modalities

## 2019-01-04 ENCOUNTER — APPOINTMENT (OUTPATIENT)
Dept: PHYSICAL THERAPY | Facility: REHABILITATION | Age: 64
End: 2019-01-04
Payer: COMMERCIAL

## 2019-01-08 ENCOUNTER — OFFICE VISIT (OUTPATIENT)
Dept: PHYSICAL THERAPY | Facility: REHABILITATION | Age: 64
End: 2019-01-08
Payer: COMMERCIAL

## 2019-01-08 DIAGNOSIS — M54.50 ACUTE RIGHT-SIDED LOW BACK PAIN WITHOUT SCIATICA: Primary | ICD-10-CM

## 2019-01-08 PROCEDURE — 97110 THERAPEUTIC EXERCISES: CPT | Performed by: PHYSICAL THERAPIST

## 2019-01-08 PROCEDURE — G8991 OTHER PT/OT GOAL STATUS: HCPCS | Performed by: PHYSICAL THERAPIST

## 2019-01-08 PROCEDURE — 97112 NEUROMUSCULAR REEDUCATION: CPT | Performed by: PHYSICAL THERAPIST

## 2019-01-08 PROCEDURE — G8992 OTHER PT/OT  D/C STATUS: HCPCS | Performed by: PHYSICAL THERAPIST

## 2019-01-08 NOTE — PROGRESS NOTES
Daily Note     Today's date: 2019  Patient name: Emiliano Bonds  : 1955  MRN: [id number]  Referring provider: Mary Rosenberg PT  Dx:   Encounter Diagnosis     ICD-10-CM    1  Acute right-sided low back pain without sciatica M54 5        Start Time: 1410  Stop Time: 1445  Total time in clinic (min): 35 minutes    Subjective: 0/10 pain on most occasions with 3/10 at worst  Overall, 90% improvement    Objective: See treatment diary below  Lx arom = wfl and pain-free  Pt able to perform a functional squat with good form  ST - I with HEP - MET  2 - stand > 5 minutes to cook a meal with < 2/10 sx's - MET  LT - FOTO > 74 - MET  2 - sit > 60 minutes while driving to/from work with < 2/10 LBP - MET  3 - amb community distances without limitation - MET  4 - lift 25# from floor to waist without pain - MET      Assessment: Tolerated treatment well   Patient exhibited good technique with therapeutic exercises      Plan: d/c        Precautions: anemia    Daily Treatment Diary     Manual  /        S/l gr 4 L5 opening LMR Gr 2 - LMR           Prone gr 1 & 2 rocking right PSIS LMR    B/l - LMR        S/l tissue deformation right sacral border LMR            Prone gr 2 P-A mid-thoracic mobs  LMR           Prone gr 2 sacral rocking  LMR LMR          Supine tissue deformation - right psoas   LMR                                        Exercise Diary  /08        Seated pelvic rocking 15 10           Seated PPT + TA 5"x5            HEP LMR LMR LMR          Seated hip hinge with dowel  5'           Standing hip hinge with dowel  2x5           Lifting mechanics - floor to waist  10# - 5'           Prone TA + glute set + hip ext  15"x5           VG - b/l   L7 - 4'          VG with shoulder extension   L7 - 5' - orange L7 - orange - 10'         Bridges on p-ball   30"x4 1'x3         Good morning   2x20          Squat mechanics with dowel   5'          OH anti-extension march   orange - 3'          Lunges - front foot on bosu    30         Standing balance on bosu - black side up    1'x3         Mini squats on bosu - blue side up    2x20         Prone - banded hs curls    yellow - 3x15         Bridges - feet on p-ball     Red - 15"x5        LTR - feet on p-ball     Red - 2x20 b/l        q-ped with UE reach     r = 2x5, l = 5x5        Air squats      3x3                                                               Modalities

## 2019-01-10 ENCOUNTER — OFFICE VISIT (OUTPATIENT)
Dept: CARDIOLOGY CLINIC | Facility: CLINIC | Age: 64
End: 2019-01-10
Payer: COMMERCIAL

## 2019-01-10 VITALS
WEIGHT: 199.4 LBS | SYSTOLIC BLOOD PRESSURE: 128 MMHG | HEIGHT: 69 IN | HEART RATE: 44 BPM | DIASTOLIC BLOOD PRESSURE: 80 MMHG | BODY MASS INDEX: 29.53 KG/M2

## 2019-01-10 DIAGNOSIS — Z13.220 SCREENING FOR HYPERCHOLESTEROLEMIA: ICD-10-CM

## 2019-01-10 DIAGNOSIS — Z13.1 SCREENING FOR DIABETES MELLITUS: ICD-10-CM

## 2019-01-10 DIAGNOSIS — E03.9 ACQUIRED HYPOTHYROIDISM: ICD-10-CM

## 2019-01-10 DIAGNOSIS — Z86.2 HISTORY OF ANEMIA: ICD-10-CM

## 2019-01-10 DIAGNOSIS — R07.9 CHEST PAIN, UNSPECIFIED TYPE: Primary | ICD-10-CM

## 2019-01-10 PROCEDURE — 93000 ELECTROCARDIOGRAM COMPLETE: CPT | Performed by: NURSE PRACTITIONER

## 2019-01-10 PROCEDURE — 99214 OFFICE O/P EST MOD 30 MIN: CPT | Performed by: NURSE PRACTITIONER

## 2019-01-10 NOTE — LETTER
January 10, 2019     Lilli Mata MD  [address] [facility], [place] [address] [address] [address]    Patient: Malena Chang   YOB: 1955   Date of Visit: 1/10/2019       Dear Dr Peggy Berg:    Thank you for referring Malena Chang to me for evaluation  Below are my notes for this consultation  If you have questions, please do not hesitate to call me  I look forward to following your patient along with you  Sincerely,        PANCHITO Heath        CC: No Recipients  PANCHITO Heath  1/10/2019  1:59 PM  Sign at close encounter  Acute visit/ Cardiology Office Note  Malena Chang   66 y o    female   MRN: [id number]  [address] [address] [phone]    PCP: Ugo Cueto  Cardiologist Dr Kristel Sanchez           Assessment/plan  Chest pain, atypical, likely musculoskeletal   She prefers nonpharmacological therapy  I recommended local heat, stretching/yoga, and to decrease weight lifting for a brief time  I suggested she contact her PCP if no improvement  · I ordered a TSH, free T4, fasting lipid profile, CBC and BMP  If there are any lab abnormalities I will call her  Advised follow-up with her PCP and endocrinologist   Her blood pressure is controlled, 128/80 she does not have hypertension  Normal ejection fraction -echo January 2018  Exercise Myoview January 2018:  No ischemia  Mild MR  History palpitations  Twenty-four Holter January 2018 average heart rate 56 beats per minute;  minimum 38 beats per minute maximum 115; History syncope, after triston and the sauna, 12/16  Negative cardiac workup  Hypothyroidism on replacement  History of anemia  She has been off iron as it caused nausea  Lipids? Unknown        HPI  This is a 72-year-old [facility] American female known to cardiologist Dr Kristel Sanchez  She has had palpitations and dyspnea in the past, of unclear etiology    She has a history of syncope 12/16, occurring after dominic and a subsequent sauna session and underwent a full cardiac evaluation  She had an exercise Myoview, achieving  target HR, showing no ischemia; she had an echocardiogram that ruled out structural heart disease, and a Holter monitor that ruled out arrhythmias  She has treated hypothyroidism  In the past, she had been asked to take Fe for anemia, but  hasnt in a while  She follows with a PCP in [place], where she used to reside  She sees him regularly; her thyroid has been stable withouot changes in her Rx  She is seen today for an acute visit  She complains of intermittent left-sided chest pressure radiating through to her back, as well as some numbness of her left arm  She is concerned because a young person in their 45s at her workplace      It is not exertional  It is worse in the morning when she wakes up  She feels like she wants to [facility]" the heart    She exercises regularly and has had a gym membership since she was in her teens  She performs Dominic regularly  She has not had to stop due to fatigue shortness of breath or chest pain  Recently she started lifting some weights and has established a relationship with a   She admits to some left posterior back discomfort around the scapular area with palpation  No recent illness    Social history:  Lifelong nonsmoker  No illicit drugs  No regular alcohol  Family history:  Her father was murdered in his 62s  Her mother is in her [de-identified]  without heart disease  No family history of premature heart disease nor sudden cardiac death  She has 2 sons  Her   a few years ago due to disease of the brain        Diagnostics  Exercise stress test  The radiopharmaceutical was injected one minute before the end of exercise      IMAGE PROPERTIES:  Chest: circumference 38 in, D cup      MYOCARDIAL PERFUSION IMAGING:  The image quality was good   Left ventricular size was normal  The TID ratio was 0 8      PERFUSION DEFECTS:  -  There were no perfusion defects      GATED SPECT:  The calculated left ventricular ejection fraction was 70 %  Left ventricular ejection fraction was within normal limits by visual estimate  There was no left ventricular regional abnormality      SUMMARY:  -  Stress results: Duration of exercise was 9 min  Target heart rate was achieved  There was no chest pain during stress  -  ECG conclusions: The stress ECG was negative for ischemia and normal   -  Perfusion imaging: There were no perfusion defects   -  Gated SPECT: The calculated left ventricular ejection fraction was 70 %  Left ventricular ejection fraction was within normal limits by visual estimate  There was no left ventricular regional abnormality      IMPRESSIONS: Normal study after maximal exercise without reproduction of symptoms  Myocardial perfusion imaging was normal at rest and with stress  Left ventricular systolic function was normal      Prepared and signed by     Brandy Salazar DO  Signed 01/11/2018 16:03:20        Assessment  Diagnoses and all orders for this visit:    Chest pain, unspecified type    Acquired hypothyroidism          Past Medical History:   Diagnosis Date    Anemia     Disease of thyroid gland        Review of Systems   Constitution: Negative for chills and weakness  Cardiovascular: Negative for chest pain, claudication, cyanosis, dyspnea on exertion, irregular heartbeat, leg swelling, near-syncope, orthopnea, palpitations, paroxysmal nocturnal dyspnea and syncope  Respiratory: Negative for cough and shortness of breath  Gastrointestinal: Negative for heartburn and nausea  Neurological: Negative for dizziness, focal weakness, headaches and light-headedness  All other systems reviewed and are negative  No Known Allergies        Current Outpatient Prescriptions:     FERROUS SULFATE PO, Take by mouth, Disp: , Rfl:     LEVOTHYROXINE SODIUM PO, Take 112 mg by mouth daily  , Disp: , Rfl:         Social History     Social History    Marital status:      Spouse name: N/A    Number of children: N/A    Years of education: N/A     Occupational History    Not on file  Social History Main Topics    Smoking status: Never Smoker    Smokeless tobacco: Never Used    Alcohol use No    Drug use: No    Sexual activity: Not on file     Other Topics Concern    Not on file     Social History Narrative    No narrative on file       No family history on file  Physical Exam   Constitutional: She is oriented to person, place, and time  No distress  HENT:   Head: Normocephalic and atraumatic  Eyes: Conjunctivae and EOM are normal    Neck: Normal range of motion  Neck supple  Cardiovascular: Normal rate, regular rhythm, normal heart sounds and intact distal pulses  Abdominal: Soft  Bowel sounds are normal    Musculoskeletal: Normal range of motion  Some reproducible chest discomfort over the mid left scapular region   Neurological: She is alert and oriented to person, place, and time  Skin: Skin is warm and dry  She is not diaphoretic  Psychiatric: She has a normal mood and affect  Nursing note and vitals reviewed  Vitals: There were no vitals taken for this visit     Wt Readings from Last 3 Encounters:   17 97 5 kg (215 lb)   17 93 kg (205 lb)   16 88 5 kg (195 lb)                 No results found for: BNP  No components found for: CHEM  Troponin I   Date Value Ref Range Status   2016 <0 02 <0 04 ng/mL Final     Results for orders placed in visit on 18   Echo complete with contrast if indicated    Narrative Jaqui [address]   [address] [place] [address]   [phone]     Transthoracic Echocardiogram   2D, M-mode, Doppler, and Color Doppler     Study date:  2018     Patient: Grover Kumar   MR number: [id number]   Account number: [de-identified]   : 1955   Age: 63 years   Gender: Female   Status: Outpatient   [facility]   Height: 69 in   Weight: 204 6 lb   BP: 104/ 79 mmHg     Indications: Shortness of breath  Diagnoses: R06 02 - Shortness of breath     Sonographer:  TAIWO Watson   Referring Physician:  Manolo Cedeno MD   Group:  [facility]   Cardiology Fellow:  Tal Chamberlain MD   Interpreting Physician:  Kimberly Nash DO     SUMMARY     LEFT VENTRICLE:   Size was normal    Systolic function was normal  Ejection fraction was estimated to be 65 %  Left ventricular diastolic function parameters were normal      RIGHT VENTRICLE:   The size was normal    Systolic function was normal      TRICUSPID VALVE:   There was mild regurgitation  HISTORY: PRIOR HISTORY: palpitations, chest pain     PROCEDURE: The study was performed in the [address]  and [facility]  This was a routine study  The transthoracic approach was used  The study included complete 2D imaging, M-mode, complete spectral Doppler, and color Doppler  Image   quality was adequate  LEFT VENTRICLE: Size was normal  Systolic function was normal  Ejection fraction was estimated to be 65 %  There were no regional wall motion abnormalities  Wall thickness was normal  There was no evidence of concentric hypertrophy  Mass   was normal  DOPPLER: The transmitral flow pattern was normal  Left ventricular diastolic function parameters were normal      RIGHT VENTRICLE: The size was normal  Systolic function was normal  Wall thickness was normal      LEFT ATRIUM: Size was normal      RIGHT ATRIUM: Size was normal      MITRAL VALVE: Valve structure was normal  There was normal leaflet separation  DOPPLER: The transmitral velocity was within the normal range  There was no evidence for stenosis  There was trace regurgitation  AORTIC VALVE: The valve was trileaflet  Leaflets exhibited normal thickness and normal cuspal separation  DOPPLER: Transaortic velocity was within the normal range   There was no evidence for stenosis  There was no regurgitation  TRICUSPID VALVE: The valve structure was normal  There was normal leaflet separation  DOPPLER: The transtricuspid velocity was within the normal range  There was no evidence for stenosis  There was mild regurgitation  Pulmonary artery   systolic pressure was within the normal range  PULMONIC VALVE: Leaflets exhibited normal thickness, no calcification, and normal cuspal separation  DOPPLER: The transpulmonic velocity was within the normal range  There was trace regurgitation  PERICARDIUM: There was no pericardial effusion  AORTA: The root exhibited normal size  SYSTEMIC VEINS: IVC: The inferior vena cava was normal in size and course  Respirophasic changes were normal      PULMONARY VEINS: DOPPLER: Doppler flow pattern was normal in the pulmonary vein(s)       SYSTEM MEASUREMENT TABLES     2D   %FS: 33 98 %   Ao Diam: 2 92 cm   EDV(Teich): 96 04 ml   EF(Cube): 71 23 %   EF(Teich): 62 98 %   ESV(Cube): 27 53 ml   ESV(Teich): 35 56 ml   IVSd: 0 8 cm   LA Area: 17 96 cm2   LA Diam: 3 53 cm   LVEDV MOD A4C: 103 65 ml   LVEF MOD A4C: 75 46 %   LVESV MOD A4C: 25 44 ml   LVIDd: 4 57 cm   LVIDs: 3 02 cm   LVLd A4C: 8 22 cm   LVLs A4C: 5 59 cm   LVPWd: 0 8 cm   RA Area: 16 27 cm2   RV Diam : 3 58 cm   SI(Cube): 32 61 ml/m2   SI(Teich): 28 94 ml/m2   SV MOD A4C: 78 21 ml   SV(Cube): 68 15 ml   SV(Teich): 60 48 ml     CW   TR Vmax: 2 48 m/s   TR maxP 55 mmHg     MM   TAPSE: 2 49 cm     PW   E': 0 07 m/s   E/E': 9 2   MV A Serge: 0 67 m/s   MV Dec Erath: 2 7 m/s2   MV DecT: 234 47 ms   MV E Serge: 0 63 m/s   MV E/A Ratio: 0 95     [employer] Accredited Echocardiography Laboratory     Prepared and electronically signed by     Ilia Lee DO   Signed 2018 14:03:15

## 2019-01-10 NOTE — PROGRESS NOTES
Acute visit/ Cardiology Office Note  Gertrude Loyd   66 y o    female   MRN: [id number]  [place] [address] [address] [id number] [phone]    PCP: Tristan Mcnulty  Cardiologist Dr Albin cline           Assessment/plan  Chest pain, atypical, likely musculoskeletal   She prefers nonpharmacological therapy  I recommended local heat, stretching/yoga, and to decrease weight lifting for a brief time  I suggested she contact her PCP if no improvement  · I ordered a TSH, free T4, fasting lipid profile, CBC and BMP  If there are any lab abnormalities I will call her  Advised follow-up with her PCP and endocrinologist   Her blood pressure is controlled, 128/80 she does not have hypertension  Normal ejection fraction -echo January 2018  Exercise Myoview January 2018:  No ischemia  Mild MR  History palpitations  Twenty-four Holter January 2018 average heart rate 56 beats per minute;  minimum 38 beats per minute maximum 115; History syncope, after triston and the sauna, 12/16  Negative cardiac workup  Hypothyroidism on replacement  History of anemia  She has been off iron as it caused nausea  Lipids? Unknown        HPI  This is a 53-year-old [place] American female known to cardiologist Dr Albin cline  She has had palpitations and dyspnea in the past, of unclear etiology  She has a history of syncope 12/16, occurring after triston and a subsequent sauna session and underwent a full cardiac evaluation  She had an exercise Myoview, achieving  target HR, showing no ischemia; she had an echocardiogram that ruled out structural heart disease, and a Holter monitor that ruled out arrhythmias  She has treated hypothyroidism  In the past, she had been asked to take Fe for anemia, but  hasnt in a while  She follows with a PCP in [place], where she used to reside  She sees him regularly; her thyroid has been stable withouot changes in her Rx      She is seen today for an acute visit   She complains of intermittent left-sided chest pressure radiating through to her back, as well as some numbness of her left arm  She is concerned because a young person in their 45s at her workplace      It is not exertional  It is worse in the morning when she wakes up  She feels like she wants to [facility]" the heart    She exercises regularly and has had a gym membership since she was in her teens  She performs Dominic regularly  She has not had to stop due to fatigue shortness of breath or chest pain  Recently she started lifting some weights and has established a relationship with a   She admits to some left posterior back discomfort around the scapular area with palpation  No recent illness    Social history:  Lifelong nonsmoker  No illicit drugs  No regular alcohol  Family history:  Her father was murdered in his 62s  Her mother is in her [de-identified]  without heart disease  No family history of premature heart disease nor sudden cardiac death  She has 2 sons  Her   a few years ago due to disease of the brain        Diagnostics  Exercise stress test  The radiopharmaceutical was injected one minute before the end of exercise      IMAGE PROPERTIES:  Chest: circumference 38 in, D cup      MYOCARDIAL PERFUSION IMAGING:  The image quality was good  Left ventricular size was normal  The TID ratio was 0 8      PERFUSION DEFECTS:  -  There were no perfusion defects      GATED SPECT:  The calculated left ventricular ejection fraction was 70 %  Left ventricular ejection fraction was within normal limits by visual estimate  There was no left ventricular regional abnormality      SUMMARY:  -  Stress results: Duration of exercise was 9 min  Target heart rate was achieved  There was no chest pain during stress  -  ECG conclusions: The stress ECG was negative for ischemia and normal   -  Perfusion imaging:  There were no perfusion defects   -  Gated SPECT: The calculated left ventricular ejection fraction was 70 %  Left ventricular ejection fraction was within normal limits by visual estimate  There was no left ventricular regional abnormality      IMPRESSIONS: Normal study after maximal exercise without reproduction of symptoms  Myocardial perfusion imaging was normal at rest and with stress  Left ventricular systolic function was normal      Prepared and signed by     Dede Deluna DO  Signed 01/11/2018 16:03:20        Assessment  Diagnoses and all orders for this visit:    Chest pain, unspecified type    Acquired hypothyroidism          Past Medical History:   Diagnosis Date    Anemia     Disease of thyroid gland        Review of Systems   Constitution: Negative for chills and weakness  Cardiovascular: Negative for chest pain, claudication, cyanosis, dyspnea on exertion, irregular heartbeat, leg swelling, near-syncope, orthopnea, palpitations, paroxysmal nocturnal dyspnea and syncope  Respiratory: Negative for cough and shortness of breath  Gastrointestinal: Negative for heartburn and nausea  Neurological: Negative for dizziness, focal weakness, headaches and light-headedness  All other systems reviewed and are negative  No Known Allergies    Current Outpatient Prescriptions:     FERROUS SULFATE PO, Take by mouth, Disp: , Rfl:     LEVOTHYROXINE SODIUM PO, Take 112 mg by mouth daily  , Disp: , Rfl:         Social History     Social History    Marital status:      Spouse name: N/A    Number of children: N/A    Years of education: N/A     Occupational History    Not on file  Social History Main Topics    Smoking status: Never Smoker    Smokeless tobacco: Never Used    Alcohol use No    Drug use: No    Sexual activity: Not on file     Other Topics Concern    Not on file     Social History Narrative    No narrative on file       No family history on file  Physical Exam   Constitutional: She is oriented to person, place, and time  No distress     HENT:   Head: Normocephalic and atraumatic  Eyes: Conjunctivae and EOM are normal    Neck: Normal range of motion  Neck supple  Cardiovascular: Normal rate, regular rhythm, normal heart sounds and intact distal pulses  Abdominal: Soft  Bowel sounds are normal    Musculoskeletal: Normal range of motion  Some reproducible chest discomfort over the mid left scapular region   Neurological: She is alert and oriented to person, place, and time  Skin: Skin is warm and dry  She is not diaphoretic  Psychiatric: She has a normal mood and affect  Nursing note and vitals reviewed  Vitals: There were no vitals taken for this visit  Wt Readings from Last 3 Encounters:   17 97 5 kg (215 lb)   17 93 kg (205 lb)   16 88 5 kg (195 lb)                 No results found for: BNP  No components found for: CHEM  Troponin I   Date Value Ref Range Status   2016 <0 02 <0 04 ng/mL Final     Results for orders placed in visit on 18   Echo complete with contrast if indicated    Narrative [address]   [phone]     Transthoracic Echocardiogram   2D, M-mode, Doppler, and Color Doppler     Study date:  2018     Patient: David De Los Santos   MR number: [id number]   Account number: [de-identified]   : 1955   Age: 63 years   Gender: Female   Status: Outpatient   [facility] [address] [place] [address] [facility]   Height: 69 in   Weight: 204 6 lb   BP: 104/ 79 mmHg     Indications: Shortness of breath  Diagnoses: R06 02 - Shortness of breath     Sonographer:  TAIWO Marshall   Referring Physician:  Kaya Chamtan MD   Group:  [facility]   Cardiology Fellow:  Tyler Mayen MD   Interpreting Physician:  lIia Lee DO     SUMMARY     LEFT VENTRICLE:   Size was normal    Systolic function was normal  Ejection fraction was estimated to be 65 %     Left ventricular diastolic function parameters were normal      RIGHT VENTRICLE:   The size was normal    Systolic function was normal      TRICUSPID VALVE:   There was mild regurgitation  HISTORY: PRIOR HISTORY: palpitations, chest pain     PROCEDURE: The study was performed in the [address]  This was a routine study  The transthoracic approach was used  The study included complete 2D imaging, M-mode, complete spectral Doppler, and color Doppler  Image   quality was adequate  LEFT VENTRICLE: Size was normal  Systolic function was normal  Ejection fraction was estimated to be 65 %  There were no regional wall motion abnormalities  Wall thickness was normal  There was no evidence of concentric hypertrophy  Mass   was normal  DOPPLER: The transmitral flow pattern was normal  Left ventricular diastolic function parameters were normal      RIGHT VENTRICLE: The size was normal  Systolic function was normal  Wall thickness was normal      LEFT ATRIUM: Size was normal      RIGHT ATRIUM: Size was normal      MITRAL VALVE: Valve structure was normal  There was normal leaflet separation  DOPPLER: The transmitral velocity was within the normal range  There was no evidence for stenosis  There was trace regurgitation  AORTIC VALVE: The valve was trileaflet  Leaflets exhibited normal thickness and normal cuspal separation  DOPPLER: Transaortic velocity was within the normal range  There was no evidence for stenosis  There was no regurgitation  TRICUSPID VALVE: The valve structure was normal  There was normal leaflet separation  DOPPLER: The transtricuspid velocity was within the normal range  There was no evidence for stenosis  There was mild regurgitation  Pulmonary artery   systolic pressure was within the normal range  PULMONIC VALVE: Leaflets exhibited normal thickness, no calcification, and normal cuspal separation  DOPPLER: The transpulmonic velocity was within the normal range  There was trace regurgitation       PERICARDIUM: There was no pericardial effusion  AORTA: The root exhibited normal size  SYSTEMIC VEINS: IVC: The inferior vena cava was normal in size and course  Respirophasic changes were normal      PULMONARY VEINS: DOPPLER: Doppler flow pattern was normal in the pulmonary vein(s)       SYSTEM MEASUREMENT TABLES     2D   %FS: 33 98 %   Ao Diam: 2 92 cm   EDV(Teich): 96 04 ml   EF(Cube): 71 23 %   EF(Teich): 62 98 %   ESV(Cube): 27 53 ml   ESV(Teich): 35 56 ml   IVSd: 0 8 cm   LA Area: 17 96 cm2   LA Diam: 3 53 cm   LVEDV MOD A4C: 103 65 ml   LVEF MOD A4C: 75 46 %   LVESV MOD A4C: 25 44 ml   LVIDd: 4 57 cm   LVIDs: 3 02 cm   LVLd A4C: 8 22 cm   LVLs A4C: 5 59 cm   LVPWd: 0 8 cm   RA Area: 16 27 cm2   RV Diam : 3 58 cm   SI(Cube): 32 61 ml/m2   SI(Teich): 28 94 ml/m2   SV MOD A4C: 78 21 ml   SV(Cube): 68 15 ml   SV(Teich): 60 48 ml     CW   TR Vmax: 2 48 m/s   TR maxP 55 mmHg     MM   TAPSE: 2 49 cm     PW   E': 0 07 m/s   E/E': 9 2   MV A Serge: 0 67 m/s   MV Dec Powell: 2 7 m/s2   MV DecT: 234 47 ms   MV E Serge: 0 63 m/s   MV E/A Ratio: 0 95     [employer] Accredited Echocardiography Laboratory     Prepared and electronically signed by     Dede Deluna DO   Signed 2018 14:03:15

## 2019-01-10 NOTE — PATIENT INSTRUCTIONS
Recommend stretching/ Yoga/ Hot Tub for likely musculoskeletal chest pain  Noncardiac Chest Pain   WHAT YOU NEED TO KNOW:   What do I need to know about noncardiac chest pain? Noncardiac chest pain is pain or discomfort in your chest that is not caused by a heart problem  It may be caused by any of the following:  · Acid reflux    · Nerve or muscle problems within the esophagus that slow the movement of food    · Increased sensitivity to pain within your esophagus     · Panic attacks, anxiety, or depression     · Chest wall, muscle, or rib pain  How is noncardiac chest pain diagnosed? Your healthcare provider will ask about your symptoms  You may need any of the following to find the cause of your chest pain:  · pH monitoring  is used to check your throat for acid reflux  · Manometry  measures the pressure within the esophagus  This test may show nerve or muscle problems that cause slow movement of food  · An endoscopy  may be done to check for ulcers or problem with your esophagus  How is noncardiac chest pain treated? Treatment depends on the cause of your chest pain  You may need any of the following:  · Medicines  may be given to treat the cause of your chest pain  You may be given medicines to decrease pain, relieve anxiety, decrease acid reflux, or relax muscles in your esophagus  · Cognitive therapy  may be helpful if you have panic attacks, anxiety, or depression  It can help you change how you react to situations that tend to trigger your chest pain  When should I seek immediate care? · You have severe chest pain  When should I contact my healthcare provider? · Your chest pain does not get better, even with treatment  · You have questions or concerns about your condition or care  CARE AGREEMENT:   You have the right to help plan your care  Learn about your health condition and how it may be treated   Discuss treatment options with your caregivers to decide what care you want to receive  You always have the right to refuse treatment  The above information is an  only  It is not intended as medical advice for individual conditions or treatments  Talk to your doctor, nurse or pharmacist before following any medical regimen to see if it is safe and effective for you  © 2017 [employer] Information is for End User's use only and may not be sold, redistributed or otherwise used for commercial purposes  All illustrations and images included in CareNotes® are the copyrighted property of A D [employer] M [employer]  or [employer]

## 2019-01-11 ENCOUNTER — APPOINTMENT (OUTPATIENT)
Dept: PHYSICAL THERAPY | Facility: REHABILITATION | Age: 64
End: 2019-01-11
Payer: COMMERCIAL

## 2019-01-12 LAB
BASOPHILS # BLD AUTO: 22 CELLS/UL (ref 0–200)
BASOPHILS NFR BLD AUTO: 0.6 %
BUN SERPL-MCNC: 17 MG/DL (ref 7–25)
BUN/CREAT SERPL: NORMAL (CALC) (ref 6–22)
CALCIUM SERPL-MCNC: 9.3 MG/DL (ref 8.6–10.4)
CHLORIDE SERPL-SCNC: 107 MMOL/L (ref 98–110)
CHOLEST SERPL-MCNC: 248 MG/DL
CHOLEST/HDLC SERPL: 4.9 (CALC)
CO2 SERPL-SCNC: 24 MMOL/L (ref 20–32)
CREAT SERPL-MCNC: 0.97 MG/DL (ref 0.5–0.99)
EOSINOPHIL # BLD AUTO: 61 CELLS/UL (ref 15–500)
EOSINOPHIL NFR BLD AUTO: 1.7 %
ERYTHROCYTE [DISTWIDTH] IN BLOOD BY AUTOMATED COUNT: 13.9 % (ref 11–15)
GLUCOSE SERPL-MCNC: 90 MG/DL (ref 65–99)
HBA1C MFR BLD: 5.6 % OF TOTAL HGB
HCT VFR BLD AUTO: 33.7 % (ref 35–45)
HDLC SERPL-MCNC: 51 MG/DL
HGB BLD-MCNC: 11.2 G/DL (ref 11.7–15.5)
LDLC SERPL CALC-MCNC: 179 MG/DL (CALC)
LYMPHOCYTES # BLD AUTO: 929 CELLS/UL (ref 850–3900)
LYMPHOCYTES NFR BLD AUTO: 25.8 %
MCH RBC QN AUTO: 26.5 PG (ref 27–33)
MCHC RBC AUTO-ENTMCNC: 33.2 G/DL (ref 32–36)
MCV RBC AUTO: 79.7 FL (ref 80–100)
MONOCYTES # BLD AUTO: 252 CELLS/UL (ref 200–950)
MONOCYTES NFR BLD AUTO: 7 %
NEUTROPHILS # BLD AUTO: 2336 CELLS/UL (ref 1500–7800)
NEUTROPHILS NFR BLD AUTO: 64.9 %
NONHDLC SERPL-MCNC: 197 MG/DL (CALC)
PLATELET # BLD AUTO: 189 THOUSAND/UL (ref 140–400)
PMV BLD REES-ECKER: 12.1 FL (ref 7.5–12.5)
POTASSIUM SERPL-SCNC: 4.3 MMOL/L (ref 3.5–5.3)
RBC # BLD AUTO: 4.23 MILLION/UL (ref 3.8–5.1)
SL AMB EGFR AFRICAN AMERICAN: 73 ML/MIN/1.73M2
SL AMB EGFR NON AFRICAN AMERICAN: 63 ML/MIN/1.73M2
SODIUM SERPL-SCNC: 139 MMOL/L (ref 135–146)
TRIGL SERPL-MCNC: 74 MG/DL
TSH SERPL-ACNC: 0.49 MIU/L (ref 0.4–4.5)
WBC # BLD AUTO: 3.6 THOUSAND/UL (ref 3.8–10.8)

## 2019-01-14 ENCOUNTER — TELEPHONE (OUTPATIENT)
Dept: CARDIOLOGY CLINIC | Facility: CLINIC | Age: 64
End: 2019-01-14

## 2019-01-14 DIAGNOSIS — E78.00 HYPERCHOLESTEREMIA: Primary | ICD-10-CM

## 2019-01-14 NOTE — TELEPHONE ENCOUNTER
Called, message relayed as given  Copy of diet printed for pt  Order placed for lipid panel in 3 months  Mailed to pt  Pt requests copy of labwork to go to PCP  Address verified & done

## 2019-01-14 NOTE — TELEPHONE ENCOUNTER
----- Message from [address] [employer] [address] sent at 1/14/2019 10:28 AM EST -----  Please call the patient regarding her abnormal result  Her total cholesterol and LDL are too high: I recommend the Mediterranean diet and wt loss of 10 lb; would recheck the numbers in 3  Months, and if not improved, would consider adding medication   Advise follow up with her PCP for evaluation of anemia

## 2019-01-15 ENCOUNTER — OFFICE VISIT (OUTPATIENT)
Dept: OBGYN CLINIC | Facility: HOSPITAL | Age: 64
End: 2019-01-15
Payer: COMMERCIAL

## 2019-01-15 ENCOUNTER — HOSPITAL ENCOUNTER (OUTPATIENT)
Dept: RADIOLOGY | Facility: HOSPITAL | Age: 64
Discharge: HOME | End: 2019-01-15
Payer: COMMERCIAL

## 2019-01-15 VITALS
HEART RATE: 65 BPM | BODY MASS INDEX: 29.21 KG/M2 | DIASTOLIC BLOOD PRESSURE: 76 MMHG | HEIGHT: 69 IN | WEIGHT: 197.2 LBS | SYSTOLIC BLOOD PRESSURE: 117 MMHG

## 2019-01-15 DIAGNOSIS — M54.50 RIGHT-SIDED LOW BACK PAIN WITHOUT SCIATICA, UNSPECIFIED CHRONICITY: ICD-10-CM

## 2019-01-15 DIAGNOSIS — M25.551 PAIN IN RIGHT HIP: ICD-10-CM

## 2019-01-15 DIAGNOSIS — S39.012A STRAIN OF LUMBAR REGION, INITIAL ENCOUNTER: ICD-10-CM

## 2019-01-15 DIAGNOSIS — M54.50 RIGHT-SIDED LOW BACK PAIN WITHOUT SCIATICA, UNSPECIFIED CHRONICITY: Primary | ICD-10-CM

## 2019-01-15 PROCEDURE — 73502 X-RAY EXAM HIP UNI 2-3 VIEWS: CPT

## 2019-01-15 PROCEDURE — 72100 X-RAY EXAM L-S SPINE 2/3 VWS: CPT

## 2019-01-15 PROCEDURE — 99203 OFFICE O/P NEW LOW 30 MIN: CPT | Performed by: PHYSICIAN ASSISTANT

## 2019-01-15 RX ORDER — PREDNISONE 10 MG/1
TABLET ORAL
Qty: 28 TABLET | Refills: 0 | Status: SHIPPED | OUTPATIENT
Start: 2019-01-15 | End: 2019-01-31

## 2019-01-15 NOTE — PROGRESS NOTES
Assessment/Plan   Diagnoses and all orders for this visit:    Right-sided low back pain without sciatica, unspecified chronicity  -     XR spine lumbar 2 or 3 views injury; Future  -     Ambulatory referral to Pain Management; Future  -     Ambulatory referral to pt/ot functional capacity evaluation; Future  -     predniSONE 10 mg tablet; 7 tabs po day 1, 6 tabs po day 2, 5 tabs po day 3, 4 tabs po day 4, 3 tabs po day 5, 2 tabs po day 6, 1 tab po day 7, Then stop    Strain of lumbar region, initial encounter  -     XR hip/pelv 2-3 vws right if performed; Future          Subjective   Patient ID: Kerman Olszewski is a 66 y o  female  Vitals:    01/15/19 1336   BP: 117/76   Pulse: 72     64yo female comes in for an evaluation of her low back  She has been having intermittent pain for many years  Recently, her pain flared again and she did 8 weeks of physical therapy  This was very helpful at resolving her pain  Over the weekend however, the pain returned  No specific injury  The pain is located in the right SI area  She has had sciatica in the past, but does not have any leg symptoms now  The pain is dull in character, mild in severity, pain does not radiate and is not associated with numbness  The following portions of the patient's history were reviewed and updated as appropriate: allergies, current medications, past family history, past medical history, past social history, past surgical history and problem list     Review of Systems  Ortho Exam  Past Medical History:   Diagnosis Date    Anemia     Disease of thyroid gland      Past Surgical History:   Procedure Laterality Date    ESOPHAGOGASTRODUODENOSCOPY N/A 5/22/2017    Procedure: ESOPHAGOGASTRODUODENOSCOPY (EGD); Surgeon: Mahesh Gilbert MD;  Location: [facility]; Service:      History reviewed  No pertinent family history  Social History     Occupational History    Not on file       Social History Main Topics    Smoking status: Never Smoker    Smokeless tobacco: Never Used    Alcohol use No    Drug use: No    Sexual activity: Not on file         Objective   Physical Exam    · Constitutional: Awake, Alert, Oriented  · Eyes: EOMI  · Psych: Mood and affect appropriate  · Heart: regular rate and rhythm  · Lungs: No audible wheezing  · Abdomen: soft  · Lymph: no lymphedema     right Low back / lumbar spine:  - Appearance   Inspection of back is normal with normal lordosis and no scoliosis, erythema, ecchymosis, or rash  - Palpation   No tenderness of the midline bony landmarks, paraspinal musculature, or SI joints bilaterally  - ROM   Full and pain-free active ROM, flexion 100, extension +30, rotation 90/90, horizontal flexion 50/50  - Motor   abductor 5/5; quadraceps 5/5; hamstrings 5/5; adductors 5/5; iliopsoas 5/5, anterior tibial 5/5; gastrosoleus 5/5; EHL 5/5; peroneal posterior tibial 5/5; EHL 5/5  - Sensation   No numbness in all LE dermatomes bilaterally  - DTRs   DTRs 2+ and symmetric bilaterally  and No clonus  - Special Tests   SLR negative    I have personally reviewed pertinent films in PACS and my interpretation is no fracture

## 2019-01-18 ENCOUNTER — TELEPHONE (OUTPATIENT)
Dept: OBGYN CLINIC | Facility: HOSPITAL | Age: 64
End: 2019-01-18

## 2019-01-18 NOTE — TELEPHONE ENCOUNTER
Patient called to make an appointment for right sided low back pain at the [place] office  She is being referred by Nuzhat Gallego  She has [employer]  She had an x-ray at [place] [address]  Patient has never seen pain management  She is scheduled for 1/31  She would like the new patient paperwork mailed to the address on file

## 2019-01-29 ENCOUNTER — EVALUATION (OUTPATIENT)
Dept: PHYSICAL THERAPY | Facility: REHABILITATION | Age: 64
End: 2019-01-29
Payer: COMMERCIAL

## 2019-01-29 DIAGNOSIS — M54.50 ACUTE RIGHT-SIDED LOW BACK PAIN WITHOUT SCIATICA: Primary | ICD-10-CM

## 2019-01-29 PROCEDURE — 97164 PT RE-EVAL EST PLAN CARE: CPT | Performed by: PHYSICAL THERAPIST

## 2019-01-29 PROCEDURE — G8991 OTHER PT/OT GOAL STATUS: HCPCS | Performed by: PHYSICAL THERAPIST

## 2019-01-29 PROCEDURE — 97140 MANUAL THERAPY 1/> REGIONS: CPT | Performed by: PHYSICAL THERAPIST

## 2019-01-29 PROCEDURE — G8990 OTHER PT/OT CURRENT STATUS: HCPCS | Performed by: PHYSICAL THERAPIST

## 2019-01-29 NOTE — PROGRESS NOTES
PT Evaluation     Today's date: 2019  Patient name: Fabiola Crane  : 1955  MRN: [id number]  Referring provider: Buck Rosenthal PT  Dx:   Encounter Diagnosis     ICD-10-CM    1  Acute right-sided low back pain without sciatica M54 5        Start Time: 1400  Stop Time: 1445  Total time in clinic (min): 45 minutes    Assessment  Assessment details: 65 y/o female with c/o right-sided LBP with intermittent right LE sx's  She was d/c'ed from PT on 19 with minimal sx's  However, by the weekend, she c/o 9/10 right-sided LBP and pain into her right LE  She denies any trauma that caused the increased sx's, but thinks the long drive (one hour) to work may have aggravated her sx's  She was having difficulty walking due to pain and missed one week of work due to the pain  She was recently seen by an ortho MD  X-rays taken of the lumbar spine and right hip  Pt referred to PT and to a spine & pain MD   She is scheduled to f/u with the Spine & Pain MD on 19     Impairments: abnormal gait, abnormal muscle tone, abnormal or restricted ROM, activity intolerance, impaired physical strength, lacks appropriate home exercise program and pain with function  Understanding of Dx/Px/POC: good   Prognosis: good    Goals  ST - I with HEP  2 - amb household distances with < 2/10 sx's  LT - FOTO > 79  2 - amb community distances without dysfunction or pain  3 - sit > 45 minutes while driving to/from work with < 1/10 pain    Plan  Patient would benefit from: skilled physical therapy  Planned therapy interventions: manual therapy, neuromuscular re-education, patient education, strengthening, therapeutic exercise and home exercise program  Frequency: 1x week  Duration in weeks: 6  Treatment plan discussed with: patient        Subjective Evaluation    Quality of life: good    Pain  At best pain ratin  At worst pain ratin      Diagnostic Tests  X-ray: abnormal  Treatments  Previous treatment: physical therapy  Patient Goals  Patient goals for therapy: increased strength, decreased pain, independence with ADLs/IADLs and return to sport/leisure activities          Objective     Concurrent Complaints  Negative for night pain, bladder dysfunction, bowel dysfunction, history of cancer, history of trauma and infection    Palpation     Right   Tenderness of the iliopsoas  Tenderness     Right Hip   Tenderness in the PSIS  Neurological Testing     Sensation     Lumbar   Left   Intact: pin prick    Right   Intact: pin prick    Active Range of Motion     Lumbar   Flexion:  WFL  Extension:  Restriction level: minimal  Left lateral flexion:  with pain Restriction level: minimal  Right lateral flexion:  Restriction level: minimal    Passive Range of Motion     Lumbar   Left lateral flexion:  Restriction level: minimal  Right lateral flexion:  Restriction level: minimal    Strength/Myotome Testing     Left Hip   Planes of Motion   Extension: 5  External rotation: 5  Internal rotation: 5    Right Hip   Planes of Motion   Extension: 5  External rotation: 4+  Internal rotation: 5    Left Knee   Flexion: 5  Extension: 5    Right Knee   Flexion: 5  Extension: 5    Left Ankle/Foot   Dorsiflexion: 5  Great toe extension: 5    Right Ankle/Foot   Dorsiflexion: 5  Great toe extension: 5    Functional Assessment      Squat  No trunk lean left and no trunk lean right       General Comments:    Lower quarter screen   Knees: unremarkable  Foot/ankle: unremarkable    Lumbar Comments  Temperature = 98 3 degrees F    Gait:  (+) dysfunction    Standing posture:  Leans towards left    Hip Comments   Seated: 90/90  PROM  Hip ir: right = 25, left = 20  Hip er:  Right = 15, left = 20    MMT  Hip ir:  Right = 4+, left = 5  Hip er:  Right = 4, left = 5      Flowsheet Rows      Most Recent Value   PT/OT G-Codes   Current Score  65   Projected Score  79   Assessment Type  Evaluation   G code set  Other PT/OT Primary   Other PT Primary Current Status ()  CK   Other PT Primary Goal Status ()  CJ          Precautions: anemia    Daily Treatment Diary     Manual  01/29            Supine tissue deformation - psoas LMR            Supine tissue deformation - iliacus LMR            Gr 1 long axis hip distraction LMR                                          Exercise Diary  01/29                                                                                                                                                                                                                                                                                    Modalities

## 2019-01-31 ENCOUNTER — CLINICAL SUPPORT (OUTPATIENT)
Dept: PAIN MEDICINE | Facility: CLINIC | Age: 64
End: 2019-01-31
Payer: COMMERCIAL

## 2019-01-31 VITALS
SYSTOLIC BLOOD PRESSURE: 125 MMHG | WEIGHT: 196.6 LBS | BODY MASS INDEX: 29.12 KG/M2 | HEIGHT: 69 IN | TEMPERATURE: 97.6 F | HEART RATE: 44 BPM | DIASTOLIC BLOOD PRESSURE: 83 MMHG

## 2019-01-31 DIAGNOSIS — M54.16 LUMBAR RADICULOPATHY: Primary | ICD-10-CM

## 2019-01-31 DIAGNOSIS — M54.50 RIGHT-SIDED LOW BACK PAIN WITHOUT SCIATICA, UNSPECIFIED CHRONICITY: ICD-10-CM

## 2019-01-31 PROCEDURE — 99244 OFF/OP CNSLTJ NEW/EST MOD 40: CPT | Performed by: ANESTHESIOLOGY

## 2019-01-31 NOTE — PROGRESS NOTES
Assessment:  1  Lumbar radiculopathy    2  Right-sided low back pain without sciatica, unspecified chronicity        Plan:  72-year-old female presenting for initial consultation regarding a long-standing history of lumbosacral back pain with radiculopathy into the right lower extremity  She denies any trauma or inciting event  The patient has done a course of physical therapy without any relief  She has also tried oral NSAIDS and Tylenol without relief  She was prescribed a course of oral steroids, however she did not take this as she does not like taking medications  X-ray of the patient's right hip and lumbar spine were unremarkable  The patient's low back pain seems to be multifactorial including myofascial and sacroiliac mediated components  The patient does appear to have lumbar radiculopathy in the right lower extremity as she did have weakness with right knee extension as well as decreased sensation on the lateral aspect of the right calf in the L5 distribution  1  Will order an MRI of the patient's lumbar spine  2  I advised the patient if her symptoms worsen to take steroid taper as prescribed  3  Patient will continue with her home exercise program  4  I will follow up the patient in 2 months     My impressions and treatment recommendations were discussed in detail with the patient who verbalized understanding and had no further questions  Discharge instructions were provided  I personally saw and examined the patient and I agree with the above discussed plan of care  No orders of the defined types were placed in this encounter  No orders of the defined types were placed in this encounter  History of Present Illness:    Joanie Adkins is a 66 y o  female presenting for initial consultation regarding right-sided lumbosacral back pain that radiates into the buttock and into the right lower extremity to the foot with associated numbness and occasional subjective weakness    The patient states that the right-sided low back pain is constant, however the leg symptoms are intermittent  She denies any left lower extremity symptoms, bladder bowel incontinence, or saddle anesthesia  She has been dealing with the symptoms for many years, however her recent flare-up has been over the past couple months  She has had symptoms similar to this in the past and has had epidural steroid injections in the past as well  This has not been for many years  She denies any trauma or inciting event  X-ray of the patient's right hip and lumbar spine were unremarkable  The patient has done a course of physical therapy without any relief  She has also tried oral NSAIDS and Tylenol without relief  She was prescribed a course of oral steroids, however she did not take this as she does not like taking medications  The patient rates her pain a 10/10 and the pain is constant  The pain does not follow any particular pattern throughout the day  The pain is described as sharp, dull, and aching  The pain is increased with lying down, standing, and sitting  She has found some relief with chiropractic treatment  I have personally reviewed and/or updated the patient's past medical history, past surgical history, family history, social history, current medications, allergies, and vital signs today  Other than as stated above, the patient denies any interval changes in medications, medical condition, mental condition, symptoms, or allergies since the last office visit  Review of Systems:    Review of Systems   Constitutional: Negative for fever and unexpected weight change  HENT: Positive for trouble swallowing  Eyes: Negative for visual disturbance  Respiratory: Negative for shortness of breath and wheezing  Cardiovascular: Negative for chest pain and palpitations  Gastrointestinal: Negative for constipation, diarrhea, nausea and vomiting     Endocrine: Negative for cold intolerance, heat intolerance and polydipsia  Hypothyroidism   Genitourinary: Negative for difficulty urinating and frequency  Musculoskeletal: Negative for arthralgias, gait problem, joint swelling and myalgias  Skin: Negative for rash  Neurological: Positive for dizziness  Negative for seizures, syncope, weakness and headaches  Hematological: Does not bruise/bleed easily  Psychiatric/Behavioral: Negative for dysphoric mood  All other systems reviewed and are negative  Patient Active Problem List   Diagnosis    Abnormal EKG    Dyspnea    Acquired hypothyroidism    Palpitations    Chest pain       Past Medical History:   Diagnosis Date    Anemia     Disease of thyroid gland        Past Surgical History:   Procedure Laterality Date    ESOPHAGOGASTRODUODENOSCOPY N/A 5/22/2017    Procedure: ESOPHAGOGASTRODUODENOSCOPY (EGD); Surgeon: Lorena Hubbard MD;  Location: [facility]; Service:        History reviewed  No pertinent family history  Social History     Occupational History    Not on file  Social History Main Topics    Smoking status: Never Smoker    Smokeless tobacco: Never Used    Alcohol use No    Drug use: No    Sexual activity: Not on file       Current Outpatient Prescriptions on File Prior to Visit   Medication Sig    LEVOTHYROXINE SODIUM PO Take 112 mg by mouth daily      [DISCONTINUED] predniSONE 10 mg tablet 7 tabs po day 1, 6 tabs po day 2, 5 tabs po day 3, 4 tabs po day 4, 3 tabs po day 5, 2 tabs po day 6, 1 tab po day 7, Then stop     No current facility-administered medications on file prior to visit  No Known Allergies    Physical Exam:    /83   Pulse (!) 44   Temp 97 6 °F (36 4 °C) (Oral)   Ht 5' 9" (1 753 m)   Wt 89 2 kg (196 lb 9 6 oz)   BMI 29 03 kg/m²     Constitutional: normal, well developed, well nourished, alert, in no distress and non-toxic and no overt pain behavior    Eyes: anicteric  HEENT: grossly intact  Neck: supple, symmetric, trachea midline and no masses   Pulmonary:even and unlabored  Cardiovascular:No edema or pitting edema present  Skin:Normal without rashes or lesions and well hydrated  Psychiatric:Mood and affect appropriate  Neurologic:Cranial Nerves II-XII grossly intact  Musculoskeletal:normal gait  Right lumbar paraspinals mildly tender to palpation from L4-S1  Right SI joint tender to palpation  Bilateral greater trochanters nontender to palpation  Bilateral patellar and Achilles reflexes were 2/4 and symmetrical   Bilateral lower extremity strength 5/5 in all muscle groups with the exception of right knee extension which was 4/5  Sensation intact to light touch in the L3 thru S1 dermatomes bilaterally, however decreased to light touch on the lateral aspect of the right calf in the L5 distribution  Negative straight leg raise bilaterally  Negative Michael's test bilaterally  Imaging      PACS Images     Show images for XR spine lumbar 2 or 3 views injury   Order Report      Order Details   Order Questions     Question Answer Comment   Exam reason pain    Note:  Enter reason for exam          Reason For Exam     pain   Dx: Right-sided low back pain without sciatica, unspecified chronicity [M54 5 (ICD-10-CM)]   Study Result     LUMBAR SPINE     INDICATION:   M54 5: Low back pain      COMPARISON:  None     VIEWS:  XR SPINE LUMBAR 2 OR 3 VIEWS INJURY        FINDINGS:     There is no evidence of acute fracture or destructive osseous lesion      Alignment is unremarkable      Mild multilevel disc height loss  Small anterior osteophytes    Facet arthropathy in lower lumbar spine      The pedicles appear intact      Soft tissues are unremarkable      IMPRESSION:     No acute findings      Workstation performed: [id number]           PACS Images     Show images for XR hip/pelv 2-3 vws right if performed   Order Report      Order Details   Order Questions     Question Answer Comment   Exam reason pain    Note:  Enter reason for exam     Reason For Exam     pain   Dx: Pain in right hip [M25 551 (ICD-10-CM)]   Study Result     RIGHT HIP     INDICATION:   M25 551: Pain in right hip      COMPARISON:  None     VIEWS:  XR HIP/PELV 2-3 VWS RIGHT W PELVIS IF PERFORMED         FINDINGS:     There is no acute fracture or dislocation      Mild right hip osteoarthritis is seen      No lytic or blastic osseous lesions      Soft tissues are unremarkable      Degenerative changes visualized lower lumbar spine      IMPRESSION:     No acute osseous abnormality            Workstation performed: [id number]

## 2019-02-05 ENCOUNTER — ANESTHESIA (OUTPATIENT)
Dept: GASTROENTEROLOGY | Facility: HOSPITAL | Age: 64
End: 2019-02-05
Payer: COMMERCIAL

## 2019-02-05 ENCOUNTER — ANESTHESIA EVENT (OUTPATIENT)
Dept: GASTROENTEROLOGY | Facility: HOSPITAL | Age: 64
End: 2019-02-05
Payer: COMMERCIAL

## 2019-02-05 ENCOUNTER — HOSPITAL ENCOUNTER (OUTPATIENT)
Facility: HOSPITAL | Age: 64
Setting detail: OUTPATIENT SURGERY
Discharge: HOME | End: 2019-02-05
Attending: SURGERY | Admitting: SURGERY
Payer: COMMERCIAL

## 2019-02-05 ENCOUNTER — TELEPHONE (OUTPATIENT)
Dept: CARDIOLOGY CLINIC | Facility: CLINIC | Age: 64
End: 2019-02-05

## 2019-02-05 VITALS
HEART RATE: 50 BPM | TEMPERATURE: 98.3 F | BODY MASS INDEX: 27.99 KG/M2 | WEIGHT: 189 LBS | SYSTOLIC BLOOD PRESSURE: 109 MMHG | OXYGEN SATURATION: 100 % | RESPIRATION RATE: 20 BRPM | HEIGHT: 69 IN | DIASTOLIC BLOOD PRESSURE: 72 MMHG

## 2019-02-05 RX ORDER — GLYCOPYRROLATE 0.2 MG/ML
INJECTION INTRAMUSCULAR; INTRAVENOUS AS NEEDED
Status: DISCONTINUED | OUTPATIENT
Start: 2019-02-05 | End: 2019-02-05 | Stop reason: SURG

## 2019-02-05 RX ORDER — PROPOFOL 10 MG/ML
INJECTION, EMULSION INTRAVENOUS AS NEEDED
Status: DISCONTINUED | OUTPATIENT
Start: 2019-02-05 | End: 2019-02-05 | Stop reason: SURG

## 2019-02-05 RX ORDER — SODIUM CHLORIDE 9 MG/ML
50 INJECTION, SOLUTION INTRAVENOUS CONTINUOUS
Status: DISCONTINUED | OUTPATIENT
Start: 2019-02-05 | End: 2019-02-05 | Stop reason: HOSPADM

## 2019-02-05 RX ORDER — SODIUM CHLORIDE, SODIUM LACTATE, POTASSIUM CHLORIDE, CALCIUM CHLORIDE 600; 310; 30; 20 MG/100ML; MG/100ML; MG/100ML; MG/100ML
INJECTION, SOLUTION INTRAVENOUS CONTINUOUS PRN
Status: DISCONTINUED | OUTPATIENT
Start: 2019-02-05 | End: 2019-02-05 | Stop reason: SURG

## 2019-02-05 RX ADMIN — PROPOFOL 100 MG: 10 INJECTION, EMULSION INTRAVENOUS at 08:17

## 2019-02-05 RX ADMIN — PROPOFOL 30 MG: 10 INJECTION, EMULSION INTRAVENOUS at 08:20

## 2019-02-05 RX ADMIN — PROPOFOL 100 MG: 10 INJECTION, EMULSION INTRAVENOUS at 08:19

## 2019-02-05 RX ADMIN — PROPOFOL 50 MG: 10 INJECTION, EMULSION INTRAVENOUS at 08:27

## 2019-02-05 RX ADMIN — GLYCOPYRROLATE 0.2 MG: 0.2 INJECTION, SOLUTION INTRAMUSCULAR; INTRAVENOUS at 08:14

## 2019-02-05 RX ADMIN — SODIUM CHLORIDE 50 ML/HR: 0.9 INJECTION, SOLUTION INTRAVENOUS at 08:09

## 2019-02-05 RX ADMIN — PROPOFOL 50 MG: 10 INJECTION, EMULSION INTRAVENOUS at 08:23

## 2019-02-05 RX ADMIN — SODIUM CHLORIDE, SODIUM LACTATE, POTASSIUM CHLORIDE, AND CALCIUM CHLORIDE: .6; .31; .03; .02 INJECTION, SOLUTION INTRAVENOUS at 08:11

## 2019-02-05 NOTE — OP NOTE
Procedure:  Colonoscopy      Preoperative diagnosis:  Screening colonoscopy    Postoperative diagnosis:  Normal screening, diverticulosis    Surgeon:  Joselin Chamberlain MD    Anesthesia:  Sedation    EBL:  0    Specimens:  None    After the procedure was discussed with the patient along with its benefits risks alternatives she was given propofol anesthesia  Colonoscope was passed from the anus to the cecum  There were no polyps, there were no tumors, and no inflammation  There was scattered diverticula throughout the left colon  At the conclusion of the procedure the patient tolerated well  The patient left procedure room in good condition      Recommendation:  Surveillance colonoscopy 10 years

## 2019-02-05 NOTE — TELEPHONE ENCOUNTER
----- Message from Dao Loredo MD sent at 2/5/2019  8:39 AM EST -----  Is this is our patient  If it is my patient then we should call the patient his cholesterol is high  I do not believe that patient is mine  Please call patient and tell her to talk to her primary care doctor or heart

## 2019-02-05 NOTE — ANESTHESIA PREPROCEDURE EVALUATION
Review of Systems/Medical History  Patient summary reviewed  Chart reviewed  No history of anesthetic complications     Cardiovascular  Negative cardio ROS Exercise tolerance (METS): >4,     Pulmonary  No shortness of breath,        GI/Hepatic  Negative GI/hepatic ROS          Negative  ROS        Endo/Other  History of thyroid disease , hypothyroidism,      GYN  Negative gynecology ROS          Hematology  Anemia ,     Musculoskeletal  Negative musculoskeletal ROS        Neurology  Negative neurology ROS      Psychology   Negative psychology ROS              Physical Exam    Airway    Mallampati score: II  TM Distance: >3 FB  Neck ROM: full     Dental       Cardiovascular  Comment: Negative ROS, Rhythm: regular, Rate: normal,     Pulmonary  Pulmonary exam normal Breath sounds clear to auscultation,     Other Findings        Anesthesia Plan  ASA Score- 2     Anesthesia Type- IV sedation with anesthesia with ASA Monitors  Additional Monitors:   Airway Plan:         Plan Factors-    Induction-     Postoperative Plan-     Informed Consent- Anesthetic plan and risks discussed with patient  I personally reviewed this patient with the CRNA  Discussed and agreed on the Anesthesia Plan with the CRNA  Nory Seymour

## 2019-02-05 NOTE — TELEPHONE ENCOUNTER
This is not your PT, called and left message for PT to contact PCP or cardiologist in reference to blood work

## 2019-03-01 ENCOUNTER — OFFICE VISIT (OUTPATIENT)
Dept: PHYSICAL THERAPY | Facility: REHABILITATION | Age: 64
End: 2019-03-01
Payer: COMMERCIAL

## 2019-03-01 DIAGNOSIS — M54.16 LUMBAR RADICULOPATHY: Primary | ICD-10-CM

## 2019-03-01 PROCEDURE — 97140 MANUAL THERAPY 1/> REGIONS: CPT | Performed by: PHYSICAL THERAPIST

## 2019-03-01 PROCEDURE — 97110 THERAPEUTIC EXERCISES: CPT | Performed by: PHYSICAL THERAPIST

## 2019-03-01 NOTE — PROGRESS NOTES
Daily Note     Today's date: 3/1/2019  Patient name: Adam Salinas  : 1955  MRN: [id number]  Referring provider: Stephanie Ngo PT  Dx:   Encounter Diagnosis     ICD-10-CM    1  Lumbar radiculopathy M54 16        Start Time: 1115  Stop Time: 1200  Total time in clinic (min): 45 minutes    Subjective: Pt denies LBP, but does c/o right hip pain  The pain is not consistent  It fluctuates  Objective: See treatment diary below  HEP updated with bridges, side planks, and standing hip swivels  Assessment: Tolerated treatment well  Patient demonstrated fatigue post treatment      Plan: Continue per plan of care            Precautions: anemia    Daily Treatment Diary     Manual             Supine tissue deformation - psoas LMR            Supine tissue deformation - iliacus LMR            Gr 1 long axis hip distraction LMR            Supine R hip PROM  LMR                            Exercise Diary             elliiptical  L1 - 7'           Standing hip swivels on slider  2x25           bridges  15"x15           Supine 90/90 hip er  RS - man resistance  5" - 5x5           Supine 90/90 hip ir RS - man resistance  5" - 2x5                                                                                                                                                                                                                  Modalities

## 2019-03-05 ENCOUNTER — OFFICE VISIT (OUTPATIENT)
Dept: PHYSICAL THERAPY | Facility: REHABILITATION | Age: 64
End: 2019-03-05
Payer: COMMERCIAL

## 2019-03-05 DIAGNOSIS — M54.16 LUMBAR RADICULOPATHY: Primary | ICD-10-CM

## 2019-03-05 PROCEDURE — 97110 THERAPEUTIC EXERCISES: CPT | Performed by: PHYSICAL THERAPIST

## 2019-03-05 NOTE — PROGRESS NOTES
Daily Note     Today's date: 3/5/2019  Patient name: Cayetano Gorman  : 1955  MRN: [id number]  Referring provider: Saad Garcia PT  Dx:   Encounter Diagnosis     ICD-10-CM    1  Lumbar radiculopathy M54 16        Start Time: 47  Stop Time:   Total time in clinic (min): 40 minutes    Subjective: Pt denies any major flare-ups of pain  She report mild discomfort in the lumbar region  Objective: See treatment diary below  Assessment: Tolerated treatment well  Patient demonstrated fatigue post treatment      Plan: Continue per plan of care           Precautions: anemia    Daily Treatment Diary     Manual   03          Supine tissue deformation - psoas LMR            Supine tissue deformation - iliacus LMR            Gr 1 long axis hip distraction LMR            Supine R hip PROM  LMR                            Exercise Diary   0305          elliiptical  L1 - 7' 5'          Standing hip swivels on slider  2x25           bridges  15"x15           Supine 90/90 hip er  RS - man resistance  5" - 5x5           Supine 90/90 hip ir RS - man resistance  5" - 2x5           Goblet squats - 20" surface   25# - 3x8          Rev lunges on sliders   2x25          Uneven carries (front rack/farmers)   10#/8# - 50 ft x 4 ea          suarez ROM deadlift   25# - 2x25          suarez ROM deadlift   2 kb's - 8#/10# - 3x7                                                                                                                                                Modalities

## 2019-03-25 ENCOUNTER — TELEPHONE (OUTPATIENT)
Dept: PAIN MEDICINE | Facility: CLINIC | Age: 64
End: 2019-03-25

## 2019-03-25 NOTE — TELEPHONE ENCOUNTER
Text Messages    Message # [id number]         2019 09:54p   [AM]  TO:  [id number] #:  [phone] Ext    HOSP NAME:  ----------------------------------------------------------------------  Dr:Mel FLANAGAN  Pt Dulce Phoenxi  :[birth year]  Emerg?:n  Msg:Calling to cancel appt for 3/26 @ 8:20a

## 2021-11-05 ENCOUNTER — COSMETIC (OUTPATIENT)
Dept: PLASTIC SURGERY | Facility: CLINIC | Age: 66
End: 2021-11-05

## 2021-11-05 VITALS
SYSTOLIC BLOOD PRESSURE: 141 MMHG | HEART RATE: 54 BPM | RESPIRATION RATE: 16 BRPM | HEIGHT: 69 IN | TEMPERATURE: 97.9 F | DIASTOLIC BLOOD PRESSURE: 81 MMHG | WEIGHT: 217.4 LBS | BODY MASS INDEX: 32.2 KG/M2

## 2021-11-05 DIAGNOSIS — Z41.1 ENCOUNTER FOR COSMETIC SURGERY: Primary | ICD-10-CM

## 2021-11-05 PROCEDURE — COSCON: Performed by: SURGERY

## 2022-01-07 ENCOUNTER — TELEPHONE (OUTPATIENT)
Dept: OTHER | Facility: OTHER | Age: 67
End: 2022-01-07

## 2022-02-02 ENCOUNTER — COSMETIC (OUTPATIENT)
Dept: PLASTIC SURGERY | Facility: CLINIC | Age: 67
End: 2022-02-02

## 2022-02-02 VITALS
SYSTOLIC BLOOD PRESSURE: 117 MMHG | HEART RATE: 64 BPM | RESPIRATION RATE: 17 BRPM | TEMPERATURE: 98.1 F | DIASTOLIC BLOOD PRESSURE: 57 MMHG | BODY MASS INDEX: 33.34 KG/M2 | WEIGHT: 225.8 LBS

## 2022-02-02 DIAGNOSIS — Z41.1 ENCOUNTER FOR COSMETIC SURGERY: ICD-10-CM

## 2022-02-02 DIAGNOSIS — Z76.89 ENCOUNTER FOR WEIGHT MANAGEMENT: Primary | ICD-10-CM

## 2022-02-02 PROCEDURE — RECHECK: Performed by: SURGERY

## 2022-02-02 NOTE — PROGRESS NOTES
Noé Souza returns for further discussion regarding potential abdominal contouring surgery (specifically abdominoplasty, as she is not interested in a lower body lift)  We again discussed the importance of weight stability prior to embarking upon body contouring surgery such as abdominoplasty,Ideally 6-12 months once reaching goal weight  She would like to lose some weight, and is now determined to embark on a weight management program   We will place a referral for a weight management consultation, and schedule a return visit here in 6 months  for further discussion  She will also work with our cosmetic coordinator regarding potential fees for abdominoplasty

## 2022-02-24 ENCOUNTER — TELEMEDICINE (OUTPATIENT)
Dept: BARIATRICS | Facility: CLINIC | Age: 67
End: 2022-02-24
Payer: COMMERCIAL

## 2022-02-24 VITALS — WEIGHT: 220 LBS | BODY MASS INDEX: 32.58 KG/M2 | HEIGHT: 69 IN

## 2022-02-24 DIAGNOSIS — Z76.89 ENCOUNTER FOR WEIGHT MANAGEMENT: ICD-10-CM

## 2022-02-24 DIAGNOSIS — E66.9 CLASS 1 OBESITY: ICD-10-CM

## 2022-02-24 DIAGNOSIS — E03.9 ACQUIRED HYPOTHYROIDISM: ICD-10-CM

## 2022-02-24 DIAGNOSIS — R63.5 ABNORMAL WEIGHT GAIN: Primary | ICD-10-CM

## 2022-02-24 PROCEDURE — 99243 OFF/OP CNSLTJ NEW/EST LOW 30: CPT | Performed by: PHYSICIAN ASSISTANT

## 2022-02-24 NOTE — PROGRESS NOTES
Assessment/Plan:    Class 1 obesity  -Discussed options of HealthyCORE-Intensive Lifestyle Intervention Program, Very Low Calorie Diet-VLCD and Conservative Program and the role of weight loss medications   -Initial weight loss goal of 5-10% weight loss for improved health  -Screening labs: TSH reviewed, recommend CMP, Lipid panel, HgbA1c and fasting insulin  -Patient is interested in pursuing conservative program  -Calorie goals, sample menu, portion size guidelines, and food logging reviewed with the patient  Negative Stop-Bang    Acquired hypothyroidism  -On Levothyroxine  -TSH WNL    Goals:    Food log (ie ) www myfitnesspal com,sparkpeople  com,loseit com,calorieking  com,etc    No sugary beverages  At least 64oz of water daily  Increase physical activity by 10 minutes daily  Gradually increase physical activity to a goal of 5 days per week for 30 minutes of MODERATE intensity PLUS 2 days per week of FULL BODY resistance training  3163-4443 calories per day  5-10 servings of fruits and vegetables per day      Follow up in approximately 6 weeks with Non-Surgical Physician/Advanced Practitioner  Diagnoses and all orders for this visit:    Abnormal weight gain  Comments:  see plan under class 1 obesity  Orders:  -     Comprehensive metabolic panel; Future  -     Lipid panel; Future  -     HEMOGLOBIN A1C W/ EAG ESTIMATION; Future  -     Insulin, fasting; Future    Class 1 obesity  -     Comprehensive metabolic panel; Future  -     Lipid panel; Future  -     HEMOGLOBIN A1C W/ EAG ESTIMATION; Future  -     Insulin, fasting; Future    Encounter for weight management  -     Ambulatory Referral to Weight Management    Acquired hypothyroidism  -     Comprehensive metabolic panel; Future  -     Lipid panel; Future  -     HEMOGLOBIN A1C W/ EAG ESTIMATION; Future  -     Insulin, fasting;  Future          Subjective:   Chief Complaint   Patient presents with    Consult    Virtual Regular Visit       Patient ID: Antonia Mulligan  is a 61 y o  female with excess weight/obesity here to pursue weight managment  Past Medical History:   Diagnosis Date    Anemia     Bradycardia     Disease of thyroid gland          HPI:  Obesity/Excess Weight:  Severity: Mild  Onset:  Past year   Modifiers: exercise, self created diets - has been successful  Contributing factors:  Insufficient Physical Activity and erratic eating schedule  Associated symptoms: fatigue and increased joint pain    Goals: 172 lbs, Referred by plastic surgery as she may be considering abdominal contouring surgery  Highest: 220 lbs    Hydration: Water at least 32-64oz, coffee + hazelnut creamer + splenda  Exercise: triston   Occupation: has a long commute,  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  ETOH: denies  SMoking: denies    Colonoscopy: completed 2019    The following portions of the patient's history were reviewed and updated as appropriate: allergies, current medications, past family history, past medical history, past social history, past surgical history and problem list       Objective:    Ht 5' 9" (1 753 m)   Wt 99 8 kg (220 lb)   BMI 32 49 kg/m²       Virtual Regular Visit    Verification of patient location:    Patient is located in the following state in which I hold an active license [place]      Assessment/Plan:    Problem List Items Addressed This Visit        Endocrine    Acquired hypothyroidism     -On Levothyroxine  -TSH WNL         Relevant Orders    Comprehensive metabolic panel    Lipid panel    HEMOGLOBIN A1C W/ EAG ESTIMATION    Insulin, fasting       Other    Class 1 obesity     -Discussed options of HealthyCORE-Intensive Lifestyle Intervention Program, Very Low Calorie Diet-VLCD and Conservative Program and the role of weight loss medications   -Initial weight loss goal of 5-10% weight loss for improved health  -Screening labs: TSH reviewed, recommend CMP, Lipid panel, HgbA1c and fasting insulin  -Patient is interested in pursuing conservative program  -Calorie goals, sample menu, portion size guidelines, and food logging reviewed with the patient  Negative Stop-Bang         Relevant Orders    Comprehensive metabolic panel    Lipid panel    HEMOGLOBIN A1C W/ EAG ESTIMATION    Insulin, fasting      Other Visit Diagnoses     Abnormal weight gain    -  Primary    see plan under class 1 obesity    Relevant Orders    Comprehensive metabolic panel    Lipid panel    HEMOGLOBIN A1C W/ EAG ESTIMATION    Insulin, fasting    Encounter for weight management                   Reason for visit is   Chief Complaint   Patient presents with    Consult    Virtual Regular Visit        Encounter provider Brianda Dubon PA-C    Provider located at [address] [place] [id number] [phone]      Recent Visits  No visits were found meeting these conditions  Showing recent visits within past 7 days and meeting all other requirements  Today's Visits  Date Type Provider Dept   02/24/22 Telemedicine Brianda Dubon PA-C [facility]   Showing today's visits and meeting all other requirements  Future Appointments  No visits were found meeting these conditions  Showing future appointments within next 150 days and meeting all other requirements       The patient was identified by name and date of birth  Antonia Mulligan was informed that this is a telemedicine visit and that the visit is being conducted through [address] [place] [address] and patient was informed that this is a secure, HIPAA-compliant platform  She agrees to proceed     My office door was closed  No one else was in the room  She acknowledged consent and understanding of privacy and security of the video platform  The patient has agreed to participate and understands they can discontinue the visit at any time  Patient is aware this is a billable service       Subjective  Antonia Mulligan is a 61 y o  female MW consult   HPI     Past Medical History:   Diagnosis Date    Anemia     Bradycardia     Disease of thyroid gland        Past Surgical History:   Procedure Laterality Date    BREAST SURGERY      biopsy     SECTION      x 2    ESOPHAGOGASTRODUODENOSCOPY N/A 2017    Procedure: ESOPHAGOGASTRODUODENOSCOPY (EGD); Surgeon: Ariadna Castillo MD;  Location:  GI LAB; Service:     FOOT SURGERY Bilateral     NM COLONOSCOPY FLX DX W/COLLJ SPEC WHEN PFRMD N/A 2019    Procedure: COLONOSCOPY;  Surgeon: Ran Neely MD;  Location:  GI LAB; Service: Colorectal       Current Outpatient Medications   Medication Sig Dispense Refill    Cyanocobalamin (B-12 PO) Take by mouth      Ferrous Sulfate (FEROSUL PO) Take 1 tablet by mouth daily      LEVOTHYROXINE SODIUM PO Take 112 mg by mouth daily        MAGNESIUM PO Take by mouth      Multiple Vitamin (MULTIVITAMIN PO) Take by mouth       No current facility-administered medications for this visit  No Known Allergies    Review of Systems   Constitutional: Negative for chills and fever  Respiratory: Positive for choking ( recently had ER visit for this  Advise follow up with PCP/GI as she still reports trouble with swallowing )  Cardiovascular: Negative for chest pain and palpitations  Gastrointestinal: Negative for abdominal pain, nausea and vomiting  Genitourinary: Negative for dysuria  Musculoskeletal: Positive for arthralgias  Skin: Negative for rash  Neurological: Positive for headaches (occasionally)  Negative for dizziness  Psychiatric/Behavioral: Negative for dysphoric mood  The patient is not nervous/anxious  Video Exam    Vitals:    22 1426   Weight: 99 8 kg (220 lb)   Height: 5' 9" (1 753 m)       Physical Exam  Nursing note reviewed            I spent 25 minutes with patient today in which greater than 50% of the time was spent in counseling/coordination of care regarding weight management    VIRTUAL VISIT DISCLAIMER      Brie Toneymaames verbally agrees to participate in [employer]  Pt is aware that Virtual Care Services could be limited without vital signs or the ability to perform a full hands-on physical Manjinder Reynoso understands she or the provider may request at any time to terminate the video visit and request the patient to seek care or treatment in person  It was my intent to perform this visit via video technology but the patient was not able to do a video connection so the visit was completed via audio telephone only

## 2022-02-24 NOTE — PATIENT INSTRUCTIONS
Goals: Food log (ie ) www myfitnesspal com,sparkpeople  com,loseit com,calorieking  com,etc    No sugary beverages  At least 64oz of water daily  Increase physical activity by 10 minutes daily   Gradually increase physical activity to a goal of 5 days per week for 30 minutes of MODERATE intensity PLUS 2 days per week of FULL BODY resistance training  2236-5606 calories per day  5-10 servings of fruits and vegetables per day

## 2022-02-24 NOTE — ASSESSMENT & PLAN NOTE
-Discussed options of HealthyCORE-Intensive Lifestyle Intervention Program, Very Low Calorie Diet-VLCD and Conservative Program and the role of weight loss medications   -Initial weight loss goal of 5-10% weight loss for improved health  -Screening labs: TSH reviewed, recommend CMP, Lipid panel, HgbA1c and fasting insulin  -Patient is interested in pursuing conservative program  -Calorie goals, sample menu, portion size guidelines, and food logging reviewed with the patient       Negative Stop-Bang

## 2022-03-04 ENCOUNTER — TELEPHONE (OUTPATIENT)
Dept: BARIATRICS | Facility: CLINIC | Age: 67
End: 2022-03-04

## 2022-03-08 NOTE — TELEPHONE ENCOUNTER
Please contact patient  I'm assuming she didn't receive her lab orders in the mail? Please print ordered labs from 2/24 and mail to patient  THank you!

## 2022-03-11 LAB
ALBUMIN SERPL-MCNC: 4.1 G/DL (ref 3.6–5.1)
ALBUMIN/GLOB SERPL: 1.2 (CALC) (ref 1–2.5)
ALP SERPL-CCNC: 82 U/L (ref 37–153)
ALT SERPL-CCNC: 13 U/L (ref 6–29)
AST SERPL-CCNC: 18 U/L (ref 10–35)
BILIRUB SERPL-MCNC: 0.5 MG/DL (ref 0.2–1.2)
BUN SERPL-MCNC: 16 MG/DL (ref 7–25)
BUN/CREAT SERPL: NORMAL (CALC) (ref 6–22)
CALCIUM SERPL-MCNC: 9.5 MG/DL (ref 8.6–10.4)
CHLORIDE SERPL-SCNC: 108 MMOL/L (ref 98–110)
CHOLEST SERPL-MCNC: 244 MG/DL
CHOLEST/HDLC SERPL: 5.1 (CALC)
CO2 SERPL-SCNC: 28 MMOL/L (ref 20–32)
CREAT SERPL-MCNC: 0.92 MG/DL (ref 0.5–0.99)
EST. AVERAGE GLUCOSE BLD GHB EST-MCNC: 120 MG/DL
EST. AVERAGE GLUCOSE BLD GHB EST-SCNC: 6.6 MMOL/L
GLOBULIN SER CALC-MCNC: 3.3 G/DL (CALC) (ref 1.9–3.7)
GLUCOSE SERPL-MCNC: 95 MG/DL (ref 65–99)
HBA1C MFR BLD: 5.8 % OF TOTAL HGB
HDLC SERPL-MCNC: 48 MG/DL
INSULIN SERPL-ACNC: 5.2 UIU/ML
LDLC SERPL CALC-MCNC: 170 MG/DL (CALC)
NONHDLC SERPL-MCNC: 196 MG/DL (CALC)
POTASSIUM SERPL-SCNC: 4.5 MMOL/L (ref 3.5–5.3)
PROT SERPL-MCNC: 7.4 G/DL (ref 6.1–8.1)
SL AMB EGFR AFRICAN AMERICAN: 76 ML/MIN/1.73M2
SL AMB EGFR NON AFRICAN AMERICAN: 66 ML/MIN/1.73M2
SODIUM SERPL-SCNC: 141 MMOL/L (ref 135–146)
TRIGL SERPL-MCNC: 128 MG/DL

## 2022-04-19 ENCOUNTER — OFFICE VISIT (OUTPATIENT)
Dept: BARIATRICS | Facility: CLINIC | Age: 67
End: 2022-04-19
Payer: COMMERCIAL

## 2022-04-19 VITALS
WEIGHT: 224.2 LBS | DIASTOLIC BLOOD PRESSURE: 82 MMHG | HEART RATE: 70 BPM | SYSTOLIC BLOOD PRESSURE: 130 MMHG | BODY MASS INDEX: 33.21 KG/M2 | TEMPERATURE: 97.2 F | HEIGHT: 69 IN

## 2022-04-19 DIAGNOSIS — E03.9 ACQUIRED HYPOTHYROIDISM: ICD-10-CM

## 2022-04-19 DIAGNOSIS — R00.2 PALPITATIONS: ICD-10-CM

## 2022-04-19 DIAGNOSIS — R13.10 DIFFICULTY SWALLOWING: ICD-10-CM

## 2022-04-19 DIAGNOSIS — R73.03 PREDIABETES: ICD-10-CM

## 2022-04-19 DIAGNOSIS — Z91.89 AT RISK FOR SLEEP APNEA: ICD-10-CM

## 2022-04-19 DIAGNOSIS — E66.9 OBESITY, CLASS I, BMI 30-34.9: Primary | ICD-10-CM

## 2022-04-19 PROCEDURE — 99214 OFFICE O/P EST MOD 30 MIN: CPT | Performed by: NURSE PRACTITIONER

## 2022-04-19 NOTE — PROGRESS NOTES
Weight Management Medical Nutrition Assessment  Khushi Conde presented for a meal planning session  Today's weight is 225 8#   Per dietary recall patient consumes excess calories from larger portions at dinner meal and choosing calorically dense foods  She will often skip meals during the day and then become overly hungry at dinner meal   We developed and reviewed a low calorie meal plan  Patient seen by Medical Provider in past 6 months:  yes  Requested to schedule appointment with Medical Provider: no      Anthropometric Measurements  Start Weight (#): 225 3#  Current Weight (#): n/a  TBW % Change from start weight:n/a  Ideal Body Weight (#):ST# LT#  Goal Weight (#):STG:under 200#  LT-185#    Weight Loss History  Previous weight loss attempts: self-directed     Food and Nutrition Related History  Long drive work in [address] [place] [address] [address] out on wkd- Waffles/ strawberrie/ sausage   Coffee with splenda / cream    Snack: Skip -Bagel with Cream Cheese  Lunch:Skip   3:00pm: Meal - Lean Protein/ veggies/ carb - large portions  Calorically dense meal   Dinner:Meal Steak or Wells   2 Sliced   Snack:skip     Beverages: water  Volume of beverage intake: 40oz     Weekends: dine out   Cravings: none reported  Trouble area of day:dinner     Frequency of Eating out: daily  Food restrictions:none reported  Cooking: self   Food Shopping: self    Physical Activity Intake  Activity:Walking/ Dominic 2 x week   Frequency:infrequent   Physical limitations/barriers to exercise: knee - OA     Estimated Needs  Energy  Bear Dianna Energy Needs:  BMR : 1630 calories    1-2# loss weekly sedentary:  957-1457 calories              1-2# loss weekly lightly active:5915-9259 calories  Maintenance calories for sedentary activity level: 195 caloris  Protein:  79-99gm    (1 2-1 5g/kg IBW)  Fluid:  77oz    (35mL/kg IBW)    Nutrition Diagnosis  Patient with obesity grade 1 related to excessive energy intake as evidenced by BMI   Nutrition Intervention    Nutrition Prescription  Calories: 1200 calories and flex to 1992-5056 calories on walk/ triston days  Protein:80-100gm  Fluid:77oz    Meal Plan (Bruce/Pro/Carb)  Breakfast: 200-300/20/30  Snack:  Lunch: 300/30/30-45  Snack: 150/>5/20  Dinner: 300/30/30-45  Snack: 150/>5/20    Nutrition Education:    Calorie controlled menu  Lean protein food choices  Healthy snack options  Food journaling tips      Nutrition Counseling:  Strategies: meal planning, portion sizes, healthy snack choices, hydration, fiber intake, protein intake, exercise, food journal      Monitoring and Evaluation:  Evaluation criteria:  Energy Intake  Meet protein needs  Maintain adequate hydration  Monitor weekly weight  Meal planning/preparation  Food journal   Decreased portions at mealtimes and snacks  Physical activity     Barriers to learning:none  Readiness to change: preparedness  Comprehension: good  Expected Compliance: fair

## 2022-04-19 NOTE — PROGRESS NOTES
Assessment/Plan:    Obesity, Class I, BMI 30-34 9  - Discussed options of Conservative Program, VLCD, and Healthy Core and the role of weight loss medications  - Patient was initially doing Conservative Program, but is interested in pursuing Healthy Core, hence that will be facilitated  - Discussed weight loss medications  Brayan Theodore would like to pursue Healthy Core first    - Initial weight loss goal of 5-10% weight loss for improved health  - Weight loss can improve patient's co-morbid conditions and/or prevent weight-related complications  -  Previous blood work reviewed below  Goals:    Food log (ie ) www myfitnesspal com,sparkpeople  com,loseit com,calorieking  com,etc    No sugary beverages  Get at least 64oz of water daily  Increase physical activity by 10 minutes daily  Gradually increase physical activity to a goal of 5 days per week for 30 minutes of MODERATE intensity PLUS 2 days per week of FULL BODY resistance training  Continue Dominic and add walks 2 days per week  At risk for sleep apnea  - Stop Octavia Reddish 4/8  Risks of untreated sleep apnea reviewed including sudden cardiac death, and increased risk for myocardial infarction and stroke  Patient agreeable to referral to sleep medicine  Referral placed  Prediabetes  - Should improve with weight loss  Weight loss medications discussed, but will pursue Healthy Core first     - Avoid refined carbohydrates including white bread, rice, pasta, pretzels, chips, pretzels, desserts and sugary beverages  Palpitations  - Advised to follow-up with cardiology or PCP  Avoid stimulants  Difficulty swallowing  - Occurs intermittently when eating  Advised to follow-up with PCP  Hypothyroidism  - Continue levothyroxine through prescribing provider  Follow up in approximately 3 months with Non-Surgical Physician/Advanced Practitioner                Subjective:   Chief Complaint   Patient presents with    Follow-up    Weight Loss        Patient ID: Harrold Apley  is a 61 y o  female with excess weight/obesity here to pursue weight managment  Patient is pursuing Conservative Program, but is interested in Healthy Core  HPI  Works 3 days per week as an    Food logging: not logging consistently, but was getting around 1200 calories when logging previously  Not currently weighing/measuring  Increased appetite/cravings: appetite increased late afternoon  Exercise: Dominic - 2-3 days per week  Hydration: >32, but less than 64 oz water, 1 cup of coffee with heavy cream, rare milk 1% or FF  Alcohol: none  Sleep: Not sleeping well  Wakes up frequently during the night  About 4 hours of sleep     B: Waffles with strawberries with light butter sometimes with sausage  S: none  L: Skip  S: none  D: Protein with veggies or bean stew sometimes with rice  S: None or ice cream    Highest weight: 224 2 lbs  Today's weight: 224 2 lbs  Last weight (2/24/2022 reported during tele visit): 220 lbs  Goal weight: 170 lbs  The following portions of the patient's history were reviewed and updated as appropriate: allergies, current medications, past family history, past medical history, past social history, past surgical history and problem list     Review of Systems   HENT: Negative for sore throat  Respiratory: Positive for choking (at times with food)  Negative for shortness of breath  Cardiovascular: Positive for palpitations (intermittent)  Negative for chest pain  Gastrointestinal: Negative for constipation, diarrhea, nausea and vomiting         + GERD - controlled    Genitourinary: Negative for dysuria  Musculoskeletal: Positive for arthralgias (knees and legs)  Skin: Negative for rash  Psychiatric/Behavioral: Negative for suicidal ideas (no HI)  + depression/anxiety  - not controlled - follow-up with PCP encouraged          Objective:    /82 (BP Location: Right arm, Patient Position: Sitting, Cuff Size: Standard)   Pulse 70 Temp (!) 97 2 °F (36 2 °C) (Tympanic)   Ht 5' 9" (1 753 m)   Wt 102 kg (224 lb 3 2 oz)   BMI 33 11 kg/m²      Physical Exam  Vitals and nursing note reviewed  Constitutional   General appearance: Abnormal   well developed and obese  Eyes No conjunctival injection  Ears, Nose, Mouth, and Throat Oral mucosa moist    Pulmonary   Respiratory effort: No increased work of breathing or signs of respiratory distress  Cardiovascular     Examination of extremities for edema and/or varicosities: Normal   no edema  Abdomen   Abdomen: Abnormal   The abdomen was obese      Musculoskeletal   Gait and station: Normal     Psychiatric   Orientation to person, place and time: Normal     Affect: appropriate    Blood work from 3/10/2022 reviewed: Cholesterol 244, HDL 48, Triglycerides 128, LDL calculated 170, random insulin 5 2  Lab Results   Component Value Date    SODIUM 141 03/10/2022    K 4 5 03/10/2022     03/10/2022    CO2 28 03/10/2022    AGAP 6 12/28/2016    BUN 16 03/10/2022    CREATININE 0 92 03/10/2022    GLUC 95 03/10/2022    CALCIUM 9 5 03/10/2022    AST 18 03/10/2022    ALT 13 03/10/2022    ALKPHOS 82 03/10/2022    TP 7 4 03/10/2022    TBILI 0 5 03/10/2022    EGFR 58 0 12/28/2016     Lab Results   Component Value Date    HGBA1C 5 8 (H) 03/10/2022

## 2022-04-20 ENCOUNTER — OFFICE VISIT (OUTPATIENT)
Dept: BARIATRICS | Facility: CLINIC | Age: 67
End: 2022-04-20

## 2022-04-20 VITALS — BODY MASS INDEX: 33.37 KG/M2 | WEIGHT: 225.3 LBS | HEIGHT: 69 IN

## 2022-04-20 DIAGNOSIS — R63.5 ABNORMAL WEIGHT GAIN: ICD-10-CM

## 2022-04-20 PROCEDURE — RECHECK: Performed by: DIETITIAN, REGISTERED

## 2022-04-20 PROCEDURE — WMDI30: Performed by: DIETITIAN, REGISTERED

## 2022-04-28 ENCOUNTER — TELEPHONE (OUTPATIENT)
Dept: BARIATRICS | Facility: CLINIC | Age: 67
End: 2022-04-28

## 2022-04-28 NOTE — TELEPHONE ENCOUNTER
5/3/22 : [facility] for pt that we got an e-mail back from billing that refund has been approved and processed as of 5/2/22 and depending on bank it could take up to 72 hours to show in her account  Pt stopped into office 4/26/22 due to she never recieved her $199 refund that was refunded back on 4/20/22  I had called billing on 4/26/22 and person I spoke to said she would notify that department about processing this refund for the pt   4/28/22 pt called back said she still did not receive her refund of $199 and is asking why it is taking so long to be put back into her account when it didn't take that long to take it out  She is not happy and wants her refund immediately  Per billing she will send an e-mail to her supervisor about issuing the refund immediately as it is processed by another department  There is no contact  info for this department  I sent e-mail to pt with the billing phone number so that she can also follow up on this and billing will call me back as well if they hear anything about the payment

## 2022-05-03 ENCOUNTER — TELEPHONE (OUTPATIENT)
Dept: BARIATRICS | Facility: CLINIC | Age: 67
End: 2022-05-03

## 2022-05-03 NOTE — TELEPHONE ENCOUNTER
tatig for pt that we got an e-mail back from billing that refund has been approved an depending on bank could take up to 72 hrs to show in her account

## 2022-05-27 ENCOUNTER — OFFICE VISIT (OUTPATIENT)
Dept: BARIATRICS | Facility: CLINIC | Age: 67
End: 2022-05-27

## 2022-05-27 VITALS — HEIGHT: 69 IN | WEIGHT: 217.3 LBS | BODY MASS INDEX: 32.19 KG/M2

## 2022-05-27 DIAGNOSIS — R63.5 ABNORMAL WEIGHT GAIN: ICD-10-CM

## 2022-05-27 PROCEDURE — WMDI30: Performed by: DIETITIAN, REGISTERED

## 2022-05-27 PROCEDURE — RECHECK: Performed by: DIETITIAN, REGISTERED

## 2022-05-27 NOTE — PROGRESS NOTES
Weight Management Medical Nutrition Assessment  Stewart Carrel presented for a meal planning session  Today's weight is 217 3 #  She has lost 8# in the past month  She has made many dietary changes including interval eating, reducing portion sizes at snacks and meals, and food journaling  She is averaging 1200 calories daily  Recommend to shift to 1400 calories on her long walk days  We developed and reviewed a low calorie meal plan       Patient seen by Medical Provider in past 6 months:  yes  Requested to schedule appointment with Medical Provider: no        Anthropometric Measurements  Start Weight (#): 225 3#  Current Weight (#): 217 3#  TBW % Change from start weight:3 6%  Ideal Body Weight (#):145#  Goal Weight (#):STG:under 200#  LT-185#     Weight Loss History  Previous weight loss attempts: self-directed      Food and Nutrition Related History  Long drive work in [address] [place] [address] Recall  Breakfast: Protein bar (30gm protein) or protein shake / yogurt   Coffee with splenda / cream- measured     Lunch: 2:00pm: Meal - 6oz Lean Protein/ 1 cup veggies/ carb - large portions  Work: yogurt or protein shake   Snack : fruit   Dinner:Steak and broccolli - smaller portion of rice  Snack:skip      Beverages: water  Volume of beverage intake: 40oz      Weekends: dine out   Cravings: none reported  Trouble area of day:no reported      Frequency of Eating out: daily  Food restrictions:none reported  Cooking: self   Food Shopping: self     Physical Activity Intake  Activity:Walking/ [place] 2 x week   Frequency:frequently   Physical limitations/barriers to exercise: knee - OA      Estimated Needs  Energy  Bear Dianna Energy Needs:  BMR : 1600 calories    1-2# loss weekly sedentary:  920-1420 calories              1-2# loss weekly lightly active:8437-6586 calories  Maintenance calories for sedentary activity level: 1920 calories  Protein: 79-99gm    (1 2-1 5g/kg IBW)  Fluid: 77oz    (35mL/kg IBW)     Nutrition Diagnosis  Patient with obesity grade 1 related to excessive energy intake as evidenced by BMI 33 2    Nutrition Intervention     Nutrition Prescription  Calories: 1200 calories and flex to 0777-9485 calories on walk/ triston days  Protein:80-100gm  Fluid:77oz     Meal Plan (Bruce/Pro/Carb)  Breakfast: 200-300/20/30  Snack:  Lunch: 300/30/30-45  Snack: 150/>5/20  Dinner: 300/30/30-45  Snack: 150/>5/20     Nutrition Education:    Calorie controlled menu  Lean protein food choices  Healthy snack options  Food journaling tips        Nutrition Counseling:  Strategies: meal planning, portion sizes, healthy snack choices, hydration, fiber intake, protein intake, exercise, food journal        Monitoring and Evaluation:  Evaluation criteria:  Energy Intake  Meet protein needs  Maintain adequate hydration  Monitor weekly weight  Meal planning/preparation  Food journal   Decreased portions at mealtimes and snacks  Physical activity      Barriers to learning:none  Readiness to change: preparedness  Comprehension: good  Expected Compliance: fair

## 2022-07-06 ENCOUNTER — TELEPHONE (OUTPATIENT)
Dept: OTHER | Facility: OTHER | Age: 67
End: 2022-07-06

## 2022-08-15 ENCOUNTER — TELEPHONE (OUTPATIENT)
Dept: OTHER | Facility: OTHER | Age: 67
End: 2022-08-15

## 2022-08-15 NOTE — TELEPHONE ENCOUNTER
Patient is calling regarding cancelling an appointment  Date/Time:8/16 @ 0830    Patient was rescheduled: YES [] NO [x]    Patient requesting call back to reschedule: YES [x] NO []    Patient calling in to cancel appt because she has work in the morning she would like to know if she could be rescheduled for some time this week

## 2022-08-26 ENCOUNTER — HOSPITAL ENCOUNTER (OUTPATIENT)
Dept: RADIOLOGY | Facility: HOSPITAL | Age: 67
Discharge: HOME | End: 2022-08-26
Payer: COMMERCIAL

## 2022-08-26 DIAGNOSIS — R13.10 PROBLEMS WITH SWALLOWING AND MASTICATION: ICD-10-CM

## 2022-08-26 PROCEDURE — 92611 MOTION FLUOROSCOPY/SWALLOW: CPT

## 2022-08-26 PROCEDURE — 74230 X-RAY XM SWLNG FUNCJ C+: CPT

## 2022-08-26 NOTE — PROCEDURES
Video Swallow Study      Patient Name: Fabiola DE LA PAZ Date: 2022        Past Medical History  Past Medical History:   Diagnosis Date    Anemia     Bradycardia     Disease of thyroid gland         Past Surgical History  Past Surgical History:   Procedure Laterality Date    BREAST SURGERY      biopsy     SECTION      x 2    ESOPHAGOGASTRODUODENOSCOPY N/A 2017    Procedure: ESOPHAGOGASTRODUODENOSCOPY (EGD); Surgeon: Debbie Parks MD;  Location: [facility]; Service:     FOOT SURGERY Bilateral     OK COLONOSCOPY FLX DX W/COLLJ SPEC WHEN PFRMD N/A 2019    Procedure: COLONOSCOPY;  Surgeon: Arlyn Giang MD;  Location: [facility]; Service: Colorectal     Video Barium Swallow Study    Summary:  Images are on PACS for review  Pt presents w/ a functional oropharyngeal swallow w/ only min delay & spill w/ thin noted  Full pharyngeal constriction noted w/ no retention  No penetration or aspiration noted  Per gross esophageal screen:  Poorly clearing esophagus w/ retropulsion noted high towards the pharynx  Recommendations:  Diet: regular   Liquids: thin   Meds: as desired, unable to offer today 2* pt being full & feeling as if she would vomit  Strategies: smaller meals, alternate bites w/ sips   Frequent oral care  Upright position  Reflux Precautions  Consider consult with: ongoing w/ GI  Results reviewed with: pt  Repeat VBS as necessary  If a dedicated assessment of the esophagus is desired, consider esophagram/barium swallow or EGD  H&P/pertinent provider notes: (PMH noted above)  Ms Doreen Montero is a 73 y/o F referred for a VBS to assess her swallow function  She reports coughing & choking on all po  No pna or other URI on record  She is independent & lives at home alone       Special Studies:  [facility]-Nonspecific changes seen in esophagus which might be consistent with either eosinophilic esophagitis or candidiasis  Biopsied  Dilated empirically  - Stomach biopsies obtained  - Mild duodenopathy  Biopsied       Previous VBS:  -    Food Allergies:  -  Current Diet:   Regular w/ thin     Dentition:  Natural   O2 requirement:  RA  Oral mech:  Strength and ROM: WFL    Vocal Quality/Speech:  Clear, intelligible   Cognitive status:  WFL    Pt was viewed sitting upright in the lateral and AP positions  Trials administered were consistent with MBSImP Validated Protocol: 5-mL thin liquid x2, 20-mL cup sip thin, 40-mL sequential swallow thin, 5-mL nectar thick, 20-mL cup sip nectar thick, 40-mL sequential swallow nectar thick, 5-mL Honey thick, 5-mL pudding, ½ cookie coated with 3-mL pudding  Pt was viewed standing laterally and AP     Oral stage:  WNL  Lip closure: wfl   Mastication: timely, effective   Bolus formation: wfl   Bolus control:wfl   Transfer: mild piecemeal w/ successive sips  Residue: none noted    Pharyngeal stage: WNL  Swallow promptness: slight delay at times  Spill to valleculae: min throughout   Spill to pyriforms: noted at times w/ nt/thin, less w/ nectar  Epiglottic inversion: present  Laryngeal excursion:  wfl  Pharyngeal constriction: wfl   Vallecular retention: none noted   Pyriform retention: none noted  PPW coating: -  Osteophytes: -  CP prominence: suspected, noted w/ the puree, and as additional trials were offered  Retropulsion through the area from the esophagus, prominence also evident  Transient penetration: none   Epiglottic undercoat: none   Penetration: none noted   Aspiration: none noted    Screening of Esophageal stage:  Full appearing esophagus following liquids, pt w/ gagging & near vomiting  This poor clearing was evident throughout  ? able motility  Retropulsion noted high to the pharynx

## 2022-10-04 ENCOUNTER — COSMETIC (OUTPATIENT)
Dept: PLASTIC SURGERY | Facility: CLINIC | Age: 67
End: 2022-10-04

## 2022-10-04 VITALS
HEART RATE: 60 BPM | WEIGHT: 208 LBS | TEMPERATURE: 97.6 F | DIASTOLIC BLOOD PRESSURE: 77 MMHG | HEIGHT: 69 IN | SYSTOLIC BLOOD PRESSURE: 126 MMHG | RESPIRATION RATE: 16 BRPM | BODY MASS INDEX: 30.81 KG/M2

## 2022-10-04 DIAGNOSIS — Z41.1 ENCOUNTER FOR COSMETIC SURGERY: Primary | ICD-10-CM

## 2022-10-04 PROCEDURE — RECHECK: Performed by: SURGERY

## 2022-10-04 NOTE — PROGRESS NOTES
Nicole Miller returns for further discussion regarding abdominoplasty  She remains interested in attempting to reach her goal weight of 180 lb, however due to some extenuating circumstances recently she has not made much progress  We again discussed the awareness that losing weight may be difficult, however if she were able to reach her goal weight, she clearly would have a better result than if we were to perform abdominoplasty her currently  I mentioned to her that reestablishing care with the weight management team is likely to be helpful for her, she understands  She will continue to attempt to reach her goal weight of 180 lb, we talked about the importance of weight stability for several/6 months prior to embarking abdominal contouring surgery  She will work with our coordinator regarding fees, and we mutually agreed to have her return in several months for further discussion regarding her weight loss progress, goals, abdominoplasty , etcetera

## 2022-11-11 ENCOUNTER — OFFICE VISIT (OUTPATIENT)
Dept: BARIATRICS | Facility: CLINIC | Age: 67
End: 2022-11-11

## 2022-11-11 VITALS — BODY MASS INDEX: 30.69 KG/M2 | WEIGHT: 207.23 LBS | HEIGHT: 69 IN

## 2022-11-11 DIAGNOSIS — R63.5 ABNORMAL WEIGHT GAIN: ICD-10-CM

## 2023-03-27 ENCOUNTER — TELEPHONE (OUTPATIENT)
Dept: OTHER | Facility: OTHER | Age: 68
End: 2023-03-27

## 2023-03-27 NOTE — TELEPHONE ENCOUNTER
Patient is calling regarding cancelling an appointment      Date/Time: 3/28/2023 @ 0930    Patient was rescheduled: YES [] NO [x]    Patient requesting call back to reschedule: YES [x] NO []    Please call to reschedule

## 2023-04-25 ENCOUNTER — COSMETIC (OUTPATIENT)
Dept: PLASTIC SURGERY | Facility: CLINIC | Age: 68
End: 2023-04-25

## 2023-04-25 VITALS
TEMPERATURE: 98.2 F | BODY MASS INDEX: 31.14 KG/M2 | HEART RATE: 54 BPM | SYSTOLIC BLOOD PRESSURE: 129 MMHG | HEIGHT: 69 IN | WEIGHT: 210.25 LBS | DIASTOLIC BLOOD PRESSURE: 89 MMHG

## 2023-04-25 DIAGNOSIS — Z41.1 ENCOUNTER FOR COSMETIC SURGERY: Primary | ICD-10-CM

## 2023-04-25 RX ORDER — MELOXICAM 15 MG/1
15 TABLET ORAL DAILY
COMMUNITY
Start: 2023-04-18 | End: 2024-04-17

## 2023-04-25 RX ORDER — ATORVASTATIN CALCIUM 10 MG/1
10 TABLET, FILM COATED ORAL
COMMUNITY
Start: 2023-04-04

## 2023-04-25 NOTE — PROGRESS NOTES
Assessment/Plan: Please see HPI  While she remains interested in losing weight, she has been unable to do so, she reports that she is planning on undergoing a left knee replacement, and is hoping that she will be able to exercise once recovered from the knee replacement surgery  She anticipates having the procedure performed in June, and we mutually agreed to have her return in September or October  Additionally, she states that she will return to be seen by the weight management team, her stated goal weight is 176 to 180 pounds, she is aware that ideally we defer abdominoplasty/lower body lift until she has been able to reach her goal weight and maintain the weight for 6 months or more  She states today that if she is unable to lose weight by her follow-up appointment she is likely to proceed with the procedure and except the risks  There are no diagnoses linked to this encounter  Subjective: Interested in abdominal contouring     Patient ID: Sneha Hollins is a 74 y o  female  HPI Christian Barton remains interested in abdominoplasty, she has been unable to lose weight as previously discussed, her goal weight as stated by her today remains 176 180 pounds, current weight is 210 pounds  The following portions of the patient's history were reviewed and updated as appropriate: allergies, current medications, past family history, past medical history, past social history, past surgical history and problem list     Review of Systems   Constitutional: Negative for chills and fever  HENT: Negative for hearing loss  Eyes: Positive for visual disturbance  Negative for discharge  Respiratory: Negative for chest tightness and shortness of breath  Cardiovascular: Negative for chest pain and leg swelling  Gastrointestinal: Negative for constipation, diarrhea, nausea and rectal pain  Genitourinary: Negative for dysuria  Musculoskeletal: Positive for gait problem  Skin: Negative for rash  "  Allergic/Immunologic: Negative for immunocompromised state  Neurological: Negative for seizures and headaches  Hematological: Does not bruise/bleed easily  Psychiatric/Behavioral: Negative for dysphoric mood  The patient is not nervous/anxious  Objective:      /89   Pulse (!) 54   Temp 98 2 °F (36 8 °C)   Ht 5' 9\" (1 753 m)   Wt 95 4 kg (210 lb 4 oz)   BMI 31 05 kg/m²          Physical Exam  HENT:      Head: Normocephalic and atraumatic  Eyes:      Extraocular Movements: Extraocular movements intact  Pupils: Pupils are equal, round, and reactive to light  Cardiovascular:      Rate and Rhythm: Normal rate  Pulmonary:      Effort: Pulmonary effort is normal    Abdominal:      Palpations: Abdomen is soft  Comments: Excess, redundant skin, subcu tissue of abdomen, lower roll, with excess extending pubis to xiphoid flanks and posterior trunk, see previous visits for photos   Musculoskeletal:         General: Normal range of motion  Cervical back: Normal range of motion  Skin:     General: Skin is warm  Neurological:      Mental Status: She is alert and oriented to person, place, and time     Psychiatric:         Mood and Affect: Mood normal          "

## 2023-09-12 ENCOUNTER — OFFICE VISIT (OUTPATIENT)
Dept: PLASTIC SURGERY | Facility: CLINIC | Age: 68
End: 2023-09-12

## 2023-09-12 VITALS — BODY MASS INDEX: 29.33 KG/M2 | WEIGHT: 198 LBS | HEIGHT: 69 IN

## 2023-09-12 DIAGNOSIS — Z41.1 ENCOUNTER FOR COSMETIC SURGERY: Primary | ICD-10-CM

## 2023-09-12 PROCEDURE — CP99024 PR-OP COSMETIC EVALUATION-PROLONGED: Performed by: SURGERY

## 2023-09-12 PROCEDURE — RECHECK: Performed by: SURGERY

## 2023-09-12 NOTE — PROGRESS NOTES
Dick Rey returns for further discussion regarding abdominoplasty. She has recently undergone left total knee replacement, and is recovering nicely. Given her recent knee surgery, and the time required away from work, she is not feel that she would have adequate time available to recover from abdominoplasty in the near future. I again discussed with her the importance of weight loss regarding beneficial effect on result, as well as the importance of weight stability prior to embarking on the procedure. , we again discussed the opportunity to have her return to the weight management team to help her with her desired weight loss, weight is obtained today in the office was 198 pounds.   We mutually agreed to have her return in 6 months for further discussion regarding weight loss, and plans for abdominoplasty, timing, etc.

## 2023-10-03 ENCOUNTER — TELEPHONE (OUTPATIENT)
Dept: BARIATRICS | Facility: CLINIC | Age: 68
End: 2023-10-03

## 2023-10-03 NOTE — TELEPHONE ENCOUNTER
Pt calling to schedule apt, returned call, N/A, LM to cb. Last ovs 5/27/22 with MWM (post op w/in 2 yrs).

## (undated) DEVICE — SINGLE-USE BIOPSY FORCEPS: Brand: RADIAL JAW 4